# Patient Record
Sex: MALE | Race: WHITE | NOT HISPANIC OR LATINO | Employment: OTHER | URBAN - METROPOLITAN AREA
[De-identification: names, ages, dates, MRNs, and addresses within clinical notes are randomized per-mention and may not be internally consistent; named-entity substitution may affect disease eponyms.]

---

## 2019-03-05 ENCOUNTER — HOSPITAL ENCOUNTER (INPATIENT)
Facility: HOSPITAL | Age: 64
LOS: 3 days | Discharge: HOME | End: 2019-03-08
Attending: EMERGENCY MEDICINE | Admitting: FAMILY MEDICINE

## 2019-03-05 ENCOUNTER — APPOINTMENT (OUTPATIENT)
Dept: CT IMAGING | Facility: HOSPITAL | Age: 64
End: 2019-03-05

## 2019-03-05 ENCOUNTER — APPOINTMENT (OUTPATIENT)
Dept: GENERAL RADIOLOGY | Facility: HOSPITAL | Age: 64
End: 2019-03-05

## 2019-03-05 DIAGNOSIS — J44.1 COPD EXACERBATION (HCC): ICD-10-CM

## 2019-03-05 DIAGNOSIS — J18.9 PNEUMONIA OF LOWER LOBE DUE TO INFECTIOUS ORGANISM, UNSPECIFIED LATERALITY: ICD-10-CM

## 2019-03-05 DIAGNOSIS — J93.9 PNEUMOTHORAX, UNSPECIFIED TYPE: Primary | ICD-10-CM

## 2019-03-05 DIAGNOSIS — Z78.9 IMPAIRED MOBILITY AND ADLS: ICD-10-CM

## 2019-03-05 DIAGNOSIS — Z74.09 IMPAIRED FUNCTIONAL MOBILITY, BALANCE, GAIT, AND ENDURANCE: ICD-10-CM

## 2019-03-05 DIAGNOSIS — I50.22 CHRONIC SYSTOLIC HEART FAILURE (HCC): ICD-10-CM

## 2019-03-05 DIAGNOSIS — R09.02 HYPOXIA: ICD-10-CM

## 2019-03-05 DIAGNOSIS — Z74.09 IMPAIRED MOBILITY AND ADLS: ICD-10-CM

## 2019-03-05 PROBLEM — Z72.0 TOBACCO ABUSE: Status: ACTIVE | Noted: 2019-03-05

## 2019-03-05 LAB
ALBUMIN SERPL-MCNC: 4.26 G/DL (ref 3.2–4.8)
ALBUMIN/GLOB SERPL: 2 G/DL (ref 1.5–2.5)
ALP SERPL-CCNC: 67 U/L (ref 25–100)
ALT SERPL W P-5'-P-CCNC: 12 U/L (ref 7–40)
ANION GAP SERPL CALCULATED.3IONS-SCNC: 5 MMOL/L (ref 3–11)
AST SERPL-CCNC: 20 U/L (ref 0–33)
BACTERIA UR QL AUTO: ABNORMAL /HPF
BASOPHILS # BLD AUTO: 0.04 10*3/MM3 (ref 0–0.2)
BASOPHILS NFR BLD AUTO: 0.3 % (ref 0–1)
BILIRUB SERPL-MCNC: 0.4 MG/DL (ref 0.3–1.2)
BILIRUB UR QL STRIP: NEGATIVE
BNP SERPL-MCNC: 70 PG/ML (ref 0–100)
BUN BLD-MCNC: 12 MG/DL (ref 9–23)
BUN/CREAT SERPL: 14.1 (ref 7–25)
CALCIUM SPEC-SCNC: 8.9 MG/DL (ref 8.7–10.4)
CHLORIDE SERPL-SCNC: 102 MMOL/L (ref 99–109)
CLARITY UR: ABNORMAL
CO2 SERPL-SCNC: 28 MMOL/L (ref 20–31)
COLOR UR: ABNORMAL
CREAT BLD-MCNC: 0.85 MG/DL (ref 0.6–1.3)
D-LACTATE SERPL-SCNC: 1.6 MMOL/L (ref 0.5–2)
DEPRECATED RDW RBC AUTO: 47.3 FL (ref 37–54)
EOSINOPHIL # BLD AUTO: 0.02 10*3/MM3 (ref 0–0.3)
EOSINOPHIL NFR BLD AUTO: 0.1 % (ref 0–3)
ERYTHROCYTE [DISTWIDTH] IN BLOOD BY AUTOMATED COUNT: 14.9 % (ref 11.3–14.5)
FLUAV AG NPH QL: NEGATIVE
FLUBV AG NPH QL IA: NEGATIVE
GFR SERPL CREATININE-BSD FRML MDRD: 91 ML/MIN/1.73
GLOBULIN UR ELPH-MCNC: 2.1 GM/DL
GLUCOSE BLD-MCNC: 99 MG/DL (ref 70–100)
GLUCOSE UR STRIP-MCNC: NEGATIVE MG/DL
HCT VFR BLD AUTO: 43.7 % (ref 38.9–50.9)
HGB BLD-MCNC: 13.9 G/DL (ref 13.1–17.5)
HGB UR QL STRIP.AUTO: ABNORMAL
HOLD SPECIMEN: NORMAL
HYALINE CASTS UR QL AUTO: ABNORMAL /LPF
IMM GRANULOCYTES # BLD AUTO: 0.03 10*3/MM3 (ref 0–0.05)
IMM GRANULOCYTES NFR BLD AUTO: 0.2 % (ref 0–0.6)
KETONES UR QL STRIP: NEGATIVE
LEUKOCYTE ESTERASE UR QL STRIP.AUTO: ABNORMAL
LYMPHOCYTES # BLD AUTO: 1.7 10*3/MM3 (ref 0.6–4.8)
LYMPHOCYTES NFR BLD AUTO: 11.1 % (ref 24–44)
MCH RBC QN AUTO: 27.8 PG (ref 27–31)
MCHC RBC AUTO-ENTMCNC: 31.8 G/DL (ref 32–36)
MCV RBC AUTO: 87.4 FL (ref 80–99)
MONOCYTES # BLD AUTO: 1.71 10*3/MM3 (ref 0–1)
MONOCYTES NFR BLD AUTO: 11.2 % (ref 0–12)
NEUTROPHILS # BLD AUTO: 11.83 10*3/MM3 (ref 1.5–8.3)
NEUTROPHILS NFR BLD AUTO: 77.3 % (ref 41–71)
NITRITE UR QL STRIP: NEGATIVE
PH UR STRIP.AUTO: 8.5 [PH] (ref 5–8)
PLATELET # BLD AUTO: 398 10*3/MM3 (ref 150–450)
PMV BLD AUTO: 10.3 FL (ref 6–12)
POTASSIUM BLD-SCNC: 4.8 MMOL/L (ref 3.5–5.5)
PROCALCITONIN SERPL-MCNC: <0.05 NG/ML
PROT SERPL-MCNC: 6.4 G/DL (ref 5.7–8.2)
PROT UR QL STRIP: NEGATIVE
RBC # BLD AUTO: 5 10*6/MM3 (ref 4.2–5.76)
RBC # UR: ABNORMAL /HPF
REF LAB TEST METHOD: ABNORMAL
SODIUM BLD-SCNC: 135 MMOL/L (ref 132–146)
SP GR UR STRIP: 1.05 (ref 1–1.03)
SQUAMOUS #/AREA URNS HPF: ABNORMAL /HPF
TROPONIN I SERPL-MCNC: 0.04 NG/ML (ref 0–0.07)
UROBILINOGEN UR QL STRIP: ABNORMAL
WBC NRBC COR # BLD: 15.3 10*3/MM3 (ref 3.5–10.8)
WBC UR QL AUTO: ABNORMAL /HPF
WHOLE BLOOD HOLD SPECIMEN: NORMAL

## 2019-03-05 PROCEDURE — 81001 URINALYSIS AUTO W/SCOPE: CPT | Performed by: EMERGENCY MEDICINE

## 2019-03-05 PROCEDURE — 63710000001 PREDNISONE PER 1 MG: Performed by: EMERGENCY MEDICINE

## 2019-03-05 PROCEDURE — 83605 ASSAY OF LACTIC ACID: CPT | Performed by: EMERGENCY MEDICINE

## 2019-03-05 PROCEDURE — 87077 CULTURE AEROBIC IDENTIFY: CPT | Performed by: EMERGENCY MEDICINE

## 2019-03-05 PROCEDURE — 80053 COMPREHEN METABOLIC PANEL: CPT | Performed by: EMERGENCY MEDICINE

## 2019-03-05 PROCEDURE — 87186 SC STD MICRODIL/AGAR DIL: CPT | Performed by: EMERGENCY MEDICINE

## 2019-03-05 PROCEDURE — 87147 CULTURE TYPE IMMUNOLOGIC: CPT | Performed by: EMERGENCY MEDICINE

## 2019-03-05 PROCEDURE — 85025 COMPLETE CBC W/AUTO DIFF WBC: CPT | Performed by: EMERGENCY MEDICINE

## 2019-03-05 PROCEDURE — 71045 X-RAY EXAM CHEST 1 VIEW: CPT

## 2019-03-05 PROCEDURE — 87040 BLOOD CULTURE FOR BACTERIA: CPT | Performed by: EMERGENCY MEDICINE

## 2019-03-05 PROCEDURE — 87150 DNA/RNA AMPLIFIED PROBE: CPT | Performed by: EMERGENCY MEDICINE

## 2019-03-05 PROCEDURE — 94640 AIRWAY INHALATION TREATMENT: CPT

## 2019-03-05 PROCEDURE — 94799 UNLISTED PULMONARY SVC/PX: CPT

## 2019-03-05 PROCEDURE — 93005 ELECTROCARDIOGRAM TRACING: CPT | Performed by: EMERGENCY MEDICINE

## 2019-03-05 PROCEDURE — 99285 EMERGENCY DEPT VISIT HI MDM: CPT

## 2019-03-05 PROCEDURE — 83880 ASSAY OF NATRIURETIC PEPTIDE: CPT | Performed by: EMERGENCY MEDICINE

## 2019-03-05 PROCEDURE — 87804 INFLUENZA ASSAY W/OPTIC: CPT | Performed by: EMERGENCY MEDICINE

## 2019-03-05 PROCEDURE — 99223 1ST HOSP IP/OBS HIGH 75: CPT | Performed by: INTERNAL MEDICINE

## 2019-03-05 PROCEDURE — 0 IOPAMIDOL PER 1 ML: Performed by: EMERGENCY MEDICINE

## 2019-03-05 PROCEDURE — 84145 PROCALCITONIN (PCT): CPT | Performed by: EMERGENCY MEDICINE

## 2019-03-05 PROCEDURE — 71275 CT ANGIOGRAPHY CHEST: CPT

## 2019-03-05 PROCEDURE — 99222 1ST HOSP IP/OBS MODERATE 55: CPT | Performed by: THORACIC SURGERY (CARDIOTHORACIC VASCULAR SURGERY)

## 2019-03-05 PROCEDURE — 84484 ASSAY OF TROPONIN QUANT: CPT

## 2019-03-05 RX ORDER — SODIUM CHLORIDE 0.9 % (FLUSH) 0.9 %
10 SYRINGE (ML) INJECTION AS NEEDED
Status: DISCONTINUED | OUTPATIENT
Start: 2019-03-05 | End: 2019-03-08 | Stop reason: HOSPADM

## 2019-03-05 RX ORDER — CHOLECALCIFEROL (VITAMIN D3) 125 MCG
5 CAPSULE ORAL NIGHTLY PRN
Status: DISCONTINUED | OUTPATIENT
Start: 2019-03-05 | End: 2019-03-08 | Stop reason: HOSPADM

## 2019-03-05 RX ORDER — ONDANSETRON 4 MG/1
4 TABLET, FILM COATED ORAL EVERY 6 HOURS PRN
Status: DISCONTINUED | OUTPATIENT
Start: 2019-03-05 | End: 2019-03-08 | Stop reason: HOSPADM

## 2019-03-05 RX ORDER — DOXYCYCLINE 100 MG/1
100 CAPSULE ORAL EVERY 12 HOURS SCHEDULED
Status: DISCONTINUED | OUTPATIENT
Start: 2019-03-05 | End: 2019-03-08 | Stop reason: HOSPADM

## 2019-03-05 RX ORDER — PREDNISONE 20 MG/1
60 TABLET ORAL ONCE
Status: COMPLETED | OUTPATIENT
Start: 2019-03-05 | End: 2019-03-05

## 2019-03-05 RX ORDER — PREDNISONE 20 MG/1
40 TABLET ORAL
Status: DISCONTINUED | OUTPATIENT
Start: 2019-03-06 | End: 2019-03-07

## 2019-03-05 RX ORDER — SODIUM CHLORIDE 0.9 % (FLUSH) 0.9 %
3-10 SYRINGE (ML) INJECTION AS NEEDED
Status: DISCONTINUED | OUTPATIENT
Start: 2019-03-05 | End: 2019-03-08 | Stop reason: HOSPADM

## 2019-03-05 RX ORDER — ACETAMINOPHEN 500 MG
1000 TABLET ORAL EVERY MORNING
COMMUNITY
End: 2019-07-11

## 2019-03-05 RX ORDER — IPRATROPIUM BROMIDE AND ALBUTEROL SULFATE 2.5; .5 MG/3ML; MG/3ML
3 SOLUTION RESPIRATORY (INHALATION) ONCE
Status: COMPLETED | OUTPATIENT
Start: 2019-03-05 | End: 2019-03-05

## 2019-03-05 RX ORDER — HYDROMORPHONE HYDROCHLORIDE 1 MG/ML
0.5 INJECTION, SOLUTION INTRAMUSCULAR; INTRAVENOUS; SUBCUTANEOUS
Status: DISCONTINUED | OUTPATIENT
Start: 2019-03-05 | End: 2019-03-08 | Stop reason: HOSPADM

## 2019-03-05 RX ORDER — MAGNESIUM SULFATE HEPTAHYDRATE 40 MG/ML
2 INJECTION, SOLUTION INTRAVENOUS AS NEEDED
Status: DISCONTINUED | OUTPATIENT
Start: 2019-03-05 | End: 2019-03-08 | Stop reason: HOSPADM

## 2019-03-05 RX ORDER — SODIUM CHLORIDE 0.9 % (FLUSH) 0.9 %
3 SYRINGE (ML) INJECTION EVERY 12 HOURS SCHEDULED
Status: DISCONTINUED | OUTPATIENT
Start: 2019-03-05 | End: 2019-03-08 | Stop reason: HOSPADM

## 2019-03-05 RX ORDER — POTASSIUM CHLORIDE 750 MG/1
40 CAPSULE, EXTENDED RELEASE ORAL AS NEEDED
Status: DISCONTINUED | OUTPATIENT
Start: 2019-03-05 | End: 2019-03-08 | Stop reason: HOSPADM

## 2019-03-05 RX ORDER — POTASSIUM CHLORIDE 7.45 MG/ML
10 INJECTION INTRAVENOUS
Status: DISCONTINUED | OUTPATIENT
Start: 2019-03-05 | End: 2019-03-08 | Stop reason: HOSPADM

## 2019-03-05 RX ORDER — POTASSIUM CHLORIDE 1.5 G/1.77G
40 POWDER, FOR SOLUTION ORAL AS NEEDED
Status: DISCONTINUED | OUTPATIENT
Start: 2019-03-05 | End: 2019-03-08 | Stop reason: HOSPADM

## 2019-03-05 RX ORDER — IPRATROPIUM BROMIDE AND ALBUTEROL SULFATE 2.5; .5 MG/3ML; MG/3ML
3 SOLUTION RESPIRATORY (INHALATION) EVERY 4 HOURS PRN
Status: DISCONTINUED | OUTPATIENT
Start: 2019-03-05 | End: 2019-03-07

## 2019-03-05 RX ORDER — ACETAMINOPHEN 325 MG/1
650 TABLET ORAL EVERY 4 HOURS PRN
Status: DISCONTINUED | OUTPATIENT
Start: 2019-03-05 | End: 2019-03-08 | Stop reason: HOSPADM

## 2019-03-05 RX ORDER — HYDROCODONE BITARTRATE AND ACETAMINOPHEN 5; 325 MG/1; MG/1
1 TABLET ORAL EVERY 4 HOURS PRN
Status: DISCONTINUED | OUTPATIENT
Start: 2019-03-05 | End: 2019-03-07

## 2019-03-05 RX ORDER — NICOTINE 21 MG/24HR
1 PATCH, TRANSDERMAL 24 HOURS TRANSDERMAL
Status: DISCONTINUED | OUTPATIENT
Start: 2019-03-05 | End: 2019-03-08 | Stop reason: HOSPADM

## 2019-03-05 RX ORDER — MAGNESIUM SULFATE HEPTAHYDRATE 40 MG/ML
4 INJECTION, SOLUTION INTRAVENOUS AS NEEDED
Status: DISCONTINUED | OUTPATIENT
Start: 2019-03-05 | End: 2019-03-08 | Stop reason: HOSPADM

## 2019-03-05 RX ORDER — ONDANSETRON 2 MG/ML
4 INJECTION INTRAMUSCULAR; INTRAVENOUS EVERY 6 HOURS PRN
Status: DISCONTINUED | OUTPATIENT
Start: 2019-03-05 | End: 2019-03-08 | Stop reason: HOSPADM

## 2019-03-05 RX ORDER — NALOXONE HCL 0.4 MG/ML
0.4 VIAL (ML) INJECTION
Status: DISCONTINUED | OUTPATIENT
Start: 2019-03-05 | End: 2019-03-08 | Stop reason: HOSPADM

## 2019-03-05 RX ORDER — SENNA AND DOCUSATE SODIUM 50; 8.6 MG/1; MG/1
2 TABLET, FILM COATED ORAL 2 TIMES DAILY PRN
Status: DISCONTINUED | OUTPATIENT
Start: 2019-03-05 | End: 2019-03-08 | Stop reason: HOSPADM

## 2019-03-05 RX ADMIN — IOPAMIDOL 80 ML: 755 INJECTION, SOLUTION INTRAVENOUS at 13:00

## 2019-03-05 RX ADMIN — DOXYCYCLINE 100 MG: 100 CAPSULE ORAL at 14:58

## 2019-03-05 RX ADMIN — SODIUM CHLORIDE, PRESERVATIVE FREE 3 ML: 5 INJECTION INTRAVENOUS at 20:12

## 2019-03-05 RX ADMIN — NICOTINE 1 PATCH: 21 PATCH, EXTENDED RELEASE TRANSDERMAL at 20:11

## 2019-03-05 RX ADMIN — HYDROCODONE BITARTRATE AND ACETAMINOPHEN 1 TABLET: 5; 325 TABLET ORAL at 20:12

## 2019-03-05 RX ADMIN — IPRATROPIUM BROMIDE AND ALBUTEROL SULFATE 3 ML: 2.5; .5 SOLUTION RESPIRATORY (INHALATION) at 12:05

## 2019-03-05 RX ADMIN — PREDNISONE 60 MG: 20 TABLET ORAL at 11:37

## 2019-03-05 RX ADMIN — DOXYCYCLINE 100 MG: 100 CAPSULE ORAL at 20:11

## 2019-03-05 NOTE — H&P
[facility] Medicine Services  HISTORY AND PHYSICAL    Patient Name: Luther Martines  : 1955  MRN: [id number]  Primary Care Physician: Amrit Sosa MD  Date of admission: 3/5/2019      Subjective   Subjective     Chief Complaint: SOA    HPI:  Luther Martines is a 63 y.o. male coming into ED with 4-5 days of worsening SOA. This has gradually worsened over this time period. Has not improved with his home albuterol treatments and IV decadron which he received at outside [facility]. He has had ongoing productive cough since this time. No f/c, chest pain.    Review of Systems   Gen- No fevers, chills  CV- No chest pain, palpitations  GI- No N/V/D, abd pain    Otherwise complete ROS reviewed and is negative except as mentioned in the HPI.    Personal History     Past Medical History:   Diagnosis Date   • Seizures (CMS/HCC)        Past Surgical History:   Procedure Laterality Date   • BRAIN SURGERY         Family History: family history is not on file. Otherwise pertinent FHx was reviewed and unremarkable.     Social History:  reports that he has been smoking.  He has been smoking about 2.00 packs per day. He does not have any smokeless tobacco history on file. He reports that he does not drink alcohol or use drugs.  Social History     Social History Narrative   • Not on file       Medications:    Available home medication information reviewed.    (Not in a hospital admission)    Allergies   Allergen Reactions   • Morphine Anxiety       Objective   Objective     Vital Signs:   Temp:  [98 °F (36.7 °C)-99.7 °F (37.6 °C)] 99.7 °F (37.6 °C)  Heart Rate:  [] 90  Resp:  [20-26] 20  BP: (125-184)/() 125/82        Physical Exam   Constitutional: No acute distress, awake, alert  Eyes: PERRLA, sclerae anicteric, no conjunctival injection  HENT: NCAT, mucous membranes moist  Neck: Supple, no thyromegaly, no lymphadenopathy, trachea midline  Respiratory: Normal WOB, wheezes  bilaterally  Cardiovascular: RRR, no murmurs, rubs, or gallops, palpable pedal pulses bilaterally  Gastrointestinal: Positive bowel sounds, soft, nontender, nondistended  Musculoskeletal: LLE significantly smaller than RLE which is chronic  Psychiatric: Appropriate affect, cooperative  Neurologic: Oriented x 3, strength symmetric in all extremities, Cranial Nerves grossly intact to confrontation, speech clear  Skin: No rashes    Results Reviewed:  I have personally reviewed current lab, radiology, and data and agree.        Results from last 7 days   Lab Units 03/05/19  1159 03/05/19  1129   SODIUM mmol/L 135  --    POTASSIUM mmol/L 4.8  --    CHLORIDE mmol/L 102  --    CO2 mmol/L 28.0  --    BUN mg/dL 12  --    CREATININE mg/dL 0.85  --    GLUCOSE mg/dL 99  --    CALCIUM mg/dL 8.9  --    ALT (SGPT) U/L 12  --    AST (SGOT) U/L 20  --    TROPONIN I ng/mL  --  0.04     Estimated Creatinine Clearance: 66.8 mL/min (by C-G formula based on SCr of 0.85 mg/dL).  Brief Urine Lab Results     None        BNP   Date Value Ref Range Status   03/05/2019 70.0 0.0 - 100.0 pg/mL Final     Comment:     Results may be falsely decreased if patient taking Biotin.     CTA chest personally reviewed without PTX by my review. Will await final interpretation.   Imaging Results (last 24 hours)     Procedure Component Value Units Date/Time    XR Chest 1 View [[id number]] Collected:  03/05/19 1143     Updated:  03/05/19 1336    Narrative:       EXAMINATION: XR CHEST 1 VW-      INDICATION: Shortness of air triage protocol.      COMPARISON: None.     FINDINGS: There is a left apical and lateral pneumothorax. There also  appears to be thickening of the more lateral parietal pleura. There are  extensive chronic pulmonary changes. The cardiac silhouette is normal.  There is no prior exam for comparison.           Impression:       There appears to be a left apical and lateral left  pneumothorax. There is also marked pleural thickening involving the  more  lateral parietal pleura. There is no prior exam for comparison. A CT  scan would be helpful to further determine the size of the pneumothorax.     D:  03/05/2019  E:  03/05/2019     This report was finalized on 3/5/2019 1:34 PM by Dr. Juwan Stock MD.       CT Angiogram Chest With Contrast [[id number]] Updated:  03/05/19 1310             Assessment/Plan   Assessment / Plan     Active Hospital Problems    Diagnosis Date Noted   • COPD exacerbation (CMS/HCC) [J44.1] 03/05/2019   • Pneumonia due to infectious organism [J18.9] 03/05/2019   • Hypoxia [R09.02] 03/05/2019   • Tobacco abuse [Z72.0] 03/05/2019     64 y/o male with COPD presenting from home with progressive SOA x 4-5 days concerning for COPD exacerbation.    A/P:    Bacterial CAP  COPD exacerbation  Hypoxia with abnl CXR  --Initial concern in ED was for PTX based on CXR however CTA did not show this. Will treat for PNA/COPD exacerbation with steroids, doxy, nebs, O2 as needed.    Tobacco abuse  --Needs to stop smoking. D/W patient. Provide NRT.    DVT prophylaxis: Mechanical.    CODE STATUS:    Code Status and Medical Interventions:   Ordered at: 03/05/19 1312     Code Status:    CPR     Medical Interventions (Level of Support Prior to Arrest):    Full       Admission Status:  I believe this patient meets OBSERVATION status, however if further evaluation or treatment plans warrant, status may change.  Based upon current information, I predict patient's care encounter to be less than or equal to 2 midnights.    Electronically signed by Tamanna Smith II, DO, 03/05/19, 2:04 PM.
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
## 2019-03-06 ENCOUNTER — APPOINTMENT (OUTPATIENT)
Dept: CARDIOLOGY | Facility: HOSPITAL | Age: 64
End: 2019-03-06

## 2019-03-06 ENCOUNTER — APPOINTMENT (OUTPATIENT)
Dept: GENERAL RADIOLOGY | Facility: HOSPITAL | Age: 64
End: 2019-03-06

## 2019-03-06 LAB
BACTERIA BLD CULT: ABNORMAL
BH CV ECHO MEAS - AO ROOT AREA: 11.5 CM^2
BH CV ECHO MEAS - AO ROOT DIAM: 3.8 CM
BH CV ECHO MEAS - EDV(CUBED): 39.6 ML
BH CV ECHO MEAS - EDV(TEICH): 47.7 ML
BH CV ECHO MEAS - EF(CUBED): 51.5 %
BH CV ECHO MEAS - EF(TEICH): 44.6 %
BH CV ECHO MEAS - ESV(CUBED): 19.2 ML
BH CV ECHO MEAS - ESV(TEICH): 26.5 ML
BH CV ECHO MEAS - FS: 21.5 %
BH CV ECHO MEAS - IVS/LVPW: 1.1
BH CV ECHO MEAS - IVSD: 0.81 CM
BH CV ECHO MEAS - LA DIMENSION: 2.7 CM
BH CV ECHO MEAS - LA/AO: 0.71
BH CV ECHO MEAS - LV MASS(C)D: 70.6 GRAMS
BH CV ECHO MEAS - LVIDD: 3.4 CM
BH CV ECHO MEAS - LVIDS: 2.7 CM
BH CV ECHO MEAS - LVPWD: 0.76 CM
BH CV ECHO MEAS - PA ACC SLOPE: 522.2 CM/SEC^2
BH CV ECHO MEAS - PA ACC TIME: 0.14 SEC
BH CV ECHO MEAS - PA PR(ACCEL): 15.6 MMHG
BH CV ECHO MEAS - RAP SYSTOLE: 3 MMHG
BH CV ECHO MEAS - RVSP: 19 MMHG
BH CV ECHO MEAS - SV(CUBED): 20.4 ML
BH CV ECHO MEAS - SV(TEICH): 21.3 ML
BH CV ECHO MEAS - TR MAX PG: 16 MMHG
BH CV ECHO MEAS - TR MAX VEL: 200.9 CM/SEC
BH CV VAS BP RIGHT ARM: NORMAL MMHG
BH CV XLRA - RV BASE: 3 CM
BH CV XLRA - RV LENGTH: 4.6 CM
BH CV XLRA - RV MID: 2.5 CM
MAGNESIUM SERPL-MCNC: 2.2 MG/DL (ref 1.3–2.7)
MAXIMAL PREDICTED HEART RATE: 157 BPM
STRESS TARGET HR: 133 BPM
TSH SERPL DL<=0.05 MIU/L-ACNC: 0.4 MIU/ML (ref 0.35–5.35)

## 2019-03-06 PROCEDURE — 25010000002 DIGOXIN PER 500 MCG: Performed by: INTERNAL MEDICINE

## 2019-03-06 PROCEDURE — 88112 CYTOPATH CELL ENHANCE TECH: CPT | Performed by: INTERNAL MEDICINE

## 2019-03-06 PROCEDURE — 93306 TTE W/DOPPLER COMPLETE: CPT | Performed by: INTERNAL MEDICINE

## 2019-03-06 PROCEDURE — 93005 ELECTROCARDIOGRAM TRACING: CPT | Performed by: INTERNAL MEDICINE

## 2019-03-06 PROCEDURE — 84443 ASSAY THYROID STIM HORMONE: CPT | Performed by: INTERNAL MEDICINE

## 2019-03-06 PROCEDURE — 83735 ASSAY OF MAGNESIUM: CPT | Performed by: INTERNAL MEDICINE

## 2019-03-06 PROCEDURE — 74018 RADEX ABDOMEN 1 VIEW: CPT

## 2019-03-06 PROCEDURE — 93306 TTE W/DOPPLER COMPLETE: CPT

## 2019-03-06 PROCEDURE — 99233 SBSQ HOSP IP/OBS HIGH 50: CPT | Performed by: INTERNAL MEDICINE

## 2019-03-06 PROCEDURE — 93010 ELECTROCARDIOGRAM REPORT: CPT | Performed by: INTERNAL MEDICINE

## 2019-03-06 PROCEDURE — 63710000001 PREDNISONE PER 1 MG: Performed by: INTERNAL MEDICINE

## 2019-03-06 RX ORDER — DIGOXIN 0.25 MG/ML
250 INJECTION INTRAMUSCULAR; INTRAVENOUS EVERY 6 HOURS
Status: DISPENSED | OUTPATIENT
Start: 2019-03-06 | End: 2019-03-06

## 2019-03-06 RX ORDER — DILTIAZEM HYDROCHLORIDE 180 MG/1
180 CAPSULE, COATED, EXTENDED RELEASE ORAL
Status: DISCONTINUED | OUTPATIENT
Start: 2019-03-06 | End: 2019-03-08 | Stop reason: HOSPADM

## 2019-03-06 RX ORDER — GUAIFENESIN/DEXTROMETHORPHAN 100-10MG/5
5 SYRUP ORAL EVERY 4 HOURS PRN
Status: DISCONTINUED | OUTPATIENT
Start: 2019-03-06 | End: 2019-03-08 | Stop reason: HOSPADM

## 2019-03-06 RX ORDER — METOPROLOL TARTRATE 5 MG/5ML
5 INJECTION INTRAVENOUS ONCE
Status: COMPLETED | OUTPATIENT
Start: 2019-03-06 | End: 2019-03-06

## 2019-03-06 RX ADMIN — NICOTINE 1 PATCH: 21 PATCH, EXTENDED RELEASE TRANSDERMAL at 08:50

## 2019-03-06 RX ADMIN — SODIUM CHLORIDE, PRESERVATIVE FREE 3 ML: 5 INJECTION INTRAVENOUS at 19:58

## 2019-03-06 RX ADMIN — MELATONIN TAB 5 MG 5 MG: 5 TAB at 19:58

## 2019-03-06 RX ADMIN — DOXYCYCLINE 100 MG: 100 CAPSULE ORAL at 19:58

## 2019-03-06 RX ADMIN — DIGOXIN 250 MCG: 0.25 INJECTION INTRAMUSCULAR; INTRAVENOUS at 09:52

## 2019-03-06 RX ADMIN — METOPROLOL TARTRATE 5 MG: 1 INJECTION, SOLUTION INTRAVENOUS at 10:40

## 2019-03-06 RX ADMIN — DOXYCYCLINE 100 MG: 100 CAPSULE ORAL at 08:49

## 2019-03-06 RX ADMIN — HYDROCODONE BITARTRATE AND ACETAMINOPHEN 1 TABLET: 5; 325 TABLET ORAL at 19:58

## 2019-03-06 RX ADMIN — PREDNISONE 40 MG: 20 TABLET ORAL at 08:49

## 2019-03-06 RX ADMIN — GUAIFENESIN AND DEXTROMETHORPHAN 5 ML: 100; 10 SYRUP ORAL at 16:03

## 2019-03-06 RX ADMIN — DILTIAZEM HYDROCHLORIDE 180 MG: 180 CAPSULE, COATED, EXTENDED RELEASE ORAL at 14:17

## 2019-03-06 RX ADMIN — SODIUM CHLORIDE, PRESERVATIVE FREE 3 ML: 5 INJECTION INTRAVENOUS at 08:52

## 2019-03-06 NOTE — PROGRESS NOTES
Discharge Planning Assessment  [facility]     Patient Name: Luther Martines  MRN: [id number]  Today's Date: 3/6/2019    Admit Date: 3/5/2019    Discharge Needs Assessment     Row Name 03/06/19 1534       Living Environment    Lives With  spouse    Name(s) of Who Lives With Patient  Champ Martines (wife) [phone]    Current Living Arrangements  home/apartment/condo    Primary Care Provided by  self    Provides Primary Care For  no one    Family Caregiver if Needed  spouse    Family Caregiver Names  Champ Martines (Spouse) [phone]    Quality of Family Relationships  helpful;involved;supportive    Able to Return to Prior Arrangements  yes       Resource/Environmental Concerns    Resource/Environmental Concerns  none       Transition Planning    Patient/Family Anticipates Transition to  home with family    Patient/Family Anticipated Services at Transition      Transportation Anticipated  family or friend will provide       Discharge Needs Assessment    Readmission Within the Last 30 Days  no previous admission in last 30 days    Concerns to be Addressed  discharge planning    Equipment Currently Used at Home  cane, straight;walker, rolling;grab bar;commode;walker, standard;nebulizer    Anticipated Changes Related to Illness  inability to care for self    Equipment Needed After Discharge  -- Patient requesting a motorized scooter.  Informed that this equipment is not covered with insurance    Outpatient/Agency/Support Group Needs  homecare agency    Discharge Facility/Level of Care Needs  home with home health    Offered/Gave Vendor List  no        Discharge Plan     Row Name 03/06/19 1537       Plan    Plan  Home with home health    Patient/Family in Agreement with Plan  yes    Plan Comments  Spoke with patient and wife at bedside.  Patient lives in [place] with wife.  Patient needs assist with ADL's.  He has declining mobility, and has macular degeneration.  Patient has poor eyesight.  Wife states  that patient can ambulate, but generally has to hold on to the walls or something in the home to get around.  She states that he can't use his cane or his walker for this reason and they need a motorized scooter for their home.  Informed patient and wife that motorized scooters are private pay DME, and offered to provide list of DME companies.  Wife states that she can look into it later.  Wife states that patient has rolling walker, cane, bedside commode, shower chair, wheelchair, grab bars and raised toilet seats in the bathroom and their house has been made handicap accesible.  States that patient is not current with home health or any other services.  Patient states that he can afford medications.  PCP is Slim Sosa M.D. per patient insurance provider is Medicare A and B with Supp. [employer].  Patient anticipates discharge home with possible home health.  Patient does not have home health agency of preference at this time.  PT and OT to evaluate patient to determine needs for discharge.  Family will transport patient at discharge.  Case management will continue to follow for discharge planning.      Final Discharge Disposition Code  06 - home with home health care        Destination      No service coordination in this encounter.      Durable Medical Equipment      No service coordination in this encounter.      Dialysis/Infusion      No service coordination in this encounter.      Home Medical Care      No service coordination in this encounter.      Community Resources      No service coordination in this encounter.          Demographic Summary     Row Name 03/06/19 7367       General Information    Admission Type  inpatient    Arrived From  home    Referral Source  admission list    Reason for Consult  discharge planning    Preferred Language  English     Used During This Interaction  no    General Information Comments  PCP:  Slim Sosa M.D. confirmed with patient        Functional Status     Row Name  03/06/19 1533       Functional Status    Usual Activity Tolerance  moderate    Current Activity Tolerance  moderate       Functional Status, IADL    Medications  assistive person    Meal Preparation  assistive person    Housekeeping  assistive person    Laundry  assistive person    Shopping  assistive person        Psychosocial    No documentation.       Abuse/Neglect    No documentation.       Legal    No documentation.       Substance Abuse    No documentation.       Patient Forms    No documentation.           Oneida Chacon RN

## 2019-03-06 NOTE — PLAN OF CARE
Problem: Patient Care Overview  Goal: Plan of Care Review  Outcome: Ongoing (interventions implemented as appropriate)   03/06/19 1625   Coping/Psychosocial   Plan of Care Reviewed With patient   Plan of Care Review   Progress no change   OTHER   Outcome Summary pt went into afib RVR this AM, IV and oral meds given. patient converted to NSR around 1330. urology consulted for hematuria. pt was requiring 2L NC. titrated to RA currently. will continue to monitor        Problem: Fall Risk (Adult)  Goal: Identify Related Risk Factors and Signs and Symptoms  Outcome: Outcome(s) achieved Date Met: 03/06/19    Goal: Absence of Fall  Outcome: Ongoing (interventions implemented as appropriate)      Problem: Chronic Obstructive Pulmonary Disease (Adult)  Goal: Signs and Symptoms of Listed Potential Problems Will be Absent, Minimized or Managed (Chronic Obstructive Pulmonary Disease)  Outcome: Ongoing (interventions implemented as appropriate)      Problem: Skin Injury Risk (Adult)  Goal: Identify Related Risk Factors and Signs and Symptoms  Outcome: Outcome(s) achieved Date Met: 03/06/19    Goal: Skin Health and Integrity  Outcome: Outcome(s) achieved Date Met: 03/06/19      Problem: Arrhythmia/Dysrhythmia (Symptomatic) (Adult)  Goal: Signs and Symptoms of Listed Potential Problems Will be Absent, Minimized or Managed (Arrhythmia/Dysrhythmia)  Outcome: Ongoing (interventions implemented as appropriate)

## 2019-03-06 NOTE — PLAN OF CARE
Problem: Patient Care Overview  Goal: Plan of Care Review  Outcome: Ongoing (interventions implemented as appropriate)   03/06/19 0223   Coping/Psychosocial   Plan of Care Reviewed With patient;family   Plan of Care Review   Progress improving   OTHER   Outcome Summary Pt able to ambulate with stand-by assist to bathroom. Use of cane at baseline. UA shows blood r/t possible renal stone. PO abx. nicotine patch ordered to help with anxiety r/t withdrawal. 1L NC with sats in 90s. minimal compliants of SOA during shift, but present with ambulation. continue to monitor.       Problem: Fall Risk (Adult)  Goal: Identify Related Risk Factors and Signs and Symptoms  Outcome: Ongoing (interventions implemented as appropriate)    Goal: Absence of Fall  Outcome: Ongoing (interventions implemented as appropriate)      Problem: Chronic Obstructive Pulmonary Disease (Adult)  Goal: Signs and Symptoms of Listed Potential Problems Will be Absent, Minimized or Managed (Chronic Obstructive Pulmonary Disease)  Outcome: Ongoing (interventions implemented as appropriate)

## 2019-03-06 NOTE — PROGRESS NOTES
[facility] Medicine Services  PROGRESS NOTE    Patient Name: Luther Martines  : 1955  MRN: [id number]    Date of Admission: 3/5/2019  Length of Stay: 1  Primary Care Physician: Amrit Sosa MD    Subjective   Subjective     CC:  dyspnea    HPI:  Breathing is not terrible but worse than baseline.   Palpitations today without dizziness - recurrent issue but never diagnosed as afib.  He has no cardiologist    Review of Systems   No fever.  no Chills  Ongoing cough  No chest pain or pressure; no cardiologist.  Longstadnign hist of palpits with resp distress or emotional upset.  Gross hematuria, hist of renal stones, no urologist, no hist of cystoscopy.  No flank or groin pain.  Otherwise ROS is negative except as mentioned in the HPI.    Objective   Objective     Vital Signs:   Temp:  [97 °F (36.1 °C)-99.7 °F (37.6 °C)] 97.3 °F (36.3 °C)  Heart Rate:  [] 63  Resp:  [18-26] 20  BP: ()/() 82/57        Physical Exam:  Gen:  slighty disheveled man in NAD, walks to BR off oxygen and without cane/walker.  L HP noted.  Neuro: alert and oriented, clear speech, follows commands, L HP arm worse than leg  HEENT:  NC/AT PERRL, OP benign  Neck:  Supple, no LAD  Heart irreg irreg rate 150s no murmur, rub, or gallop  Lungs scattered wheeze, mild tachypnea, no access musle use.  Abd:  Soft, nontender, no rebound or guarding, pos BS  Extrem:  No c/c/e    Results Reviewed:  I have personally reviewed current lab, radiology, and data and agree.    Results from last 7 days   Lab Units 19  1127   WBC 10*3/mm3 15.30*   HEMOGLOBIN g/dL 13.9   HEMATOCRIT % 43.7   PLATELETS 10*3/mm3 398     Results from last 7 days   Lab Units 19  1159 19  1129   SODIUM mmol/L 135  --    POTASSIUM mmol/L 4.8  --    CHLORIDE mmol/L 102  --    CO2 mmol/L 28.0  --    BUN mg/dL 12  --    CREATININE mg/dL 0.85  --    GLUCOSE mg/dL 99  --    CALCIUM mg/dL 8.9  --    ALT (SGPT) U/L 12  --    AST  (SGOT) U/L 20  --    TROPONIN I ng/mL  --  0.04     Estimated Creatinine Clearance: 66.8 mL/min (by C-G formula based on SCr of 0.85 mg/dL).    BNP   Date Value Ref Range Status   03/05/2019 70.0 0.0 - 100.0 pg/mL Final     Comment:     Results may be falsely decreased if patient taking Biotin.       Microbiology Results Abnormal     Procedure Component Value - Date/Time    Blood Culture - Blood, Arm, Right [[id number]] Collected:  03/05/19 1115    Lab Status:  Preliminary result Specimen:  Blood from Arm, Right Updated:  03/06/19 0030     Blood Culture No growth at less than 24 hours    Blood Culture - Blood, Arm, Right [[id number]] Collected:  03/05/19 1130    Lab Status:  Preliminary result Specimen:  Blood from Arm, Right Updated:  03/06/19 0030     Blood Culture No growth at less than 24 hours    Influenza Antigen, Rapid - Swab, Nasopharynx [[id number]]  (Normal) Collected:  03/05/19 1126    Lab Status:  Final result Specimen:  Swab from Nasopharynx Updated:  03/05/19 1203     Influenza A Ag, EIA Negative     Influenza B Ag, EIA Negative          Imaging Results (last 24 hours)     Procedure Component Value Units Date/Time    CT Angiogram Chest With Contrast [[id number]] Collected:  03/05/19 1413     Updated:  03/05/19 1633    Narrative:       EXAMINATION: CT ANGIOGRAM CHEST WITH CONTRAST-03/05/2019:      INDICATION: Dyspnea/pneumothorax.         TECHNIQUE: CT angiogram of the chest was performed following bolus  infusion of contrast. Images are displayed in the axial and coronal  projections (2-D).     The radiation dose reduction device was turned on for each scan per the  ALARA (As Low as Reasonably Achievable) protocol.     COMPARISON: Chest x-ray of the same day.     FINDINGS: There is no axillary lymphadenopathy. There is no significant  mediastinal or hilar adenopathy. There is no pulmonary embolus. There is  no pericardial effusion. Limited images of the upper abdomen are normal.  Images displayed at lung  window settings fail to confirm the presence of  a pneumothorax. There are dense pleural calcifications which in the AP  projection could simulate a pneumothorax. There is a small focal  somewhat nodular area of airspace disease in the left upper lobe  laterally. There is no consolidation. There are chronic obstructive  findings.       Impression:       1. There is no evidence of pulmonary embolus.  2. There is no pneumothorax. There are dense pleural calcifications  anteriorly which can simulate a pneumothorax on a chest x-ray.  3. There is a small somewhat nodular airspace process in the left upper  lobe, abutting the pleura. This is likely postinflammatory change, but  there are no prior exams for comparison.     D:  03/05/2019  E:  03/05/2019     This report was finalized on 3/5/2019 4:22 PM by Dr. Juwan Stock MD.       XR Chest 1 View [[id number]] Collected:  03/05/19 1143     Updated:  03/05/19 1336    Narrative:       EXAMINATION: XR CHEST 1 VW-      INDICATION: Shortness of air triage protocol.      COMPARISON: None.     FINDINGS: There is a left apical and lateral pneumothorax. There also  appears to be thickening of the more lateral parietal pleura. There are  extensive chronic pulmonary changes. The cardiac silhouette is normal.  There is no prior exam for comparison.           Impression:       There appears to be a left apical and lateral left  pneumothorax. There is also marked pleural thickening involving the more  lateral parietal pleura. There is no prior exam for comparison. A CT  scan would be helpful to further determine the size of the pneumothorax.     D:  03/05/2019  E:  03/05/2019     This report was finalized on 3/5/2019 1:34 PM by Dr. Juwan Stock MD.                  I have reviewed the medications:    Current Facility-Administered Medications:   •  acetaminophen (TYLENOL) tablet 650 mg, 650 mg, Oral, Q4H PRN, Tamanna Smith II, DO  •  doxycycline (MONODOX) capsule 100 mg, 100 mg, Oral,  Q12H, LuisTamanna M II, DO, 100 mg at 03/06/19 0849  •  HYDROcodone-acetaminophen (NORCO) 5-325 MG per tablet 1 tablet, 1 tablet, Oral, Q4H PRN, LuisTamanna M II, DO, 1 tablet at 03/05/19 2012  •  HYDROmorphone (DILAUDID) injection 0.5 mg, 0.5 mg, Intravenous, Q2H PRN **AND** naloxone (NARCAN) injection 0.4 mg, 0.4 mg, Intravenous, Q5 Min PRN, Luis, Tamanna M II, DO  •  ipratropium-albuterol (DUO-NEB) nebulizer solution 3 mL, 3 mL, Nebulization, Q4H PRN, Luis, Tamanna M II, DO  •  Magnesium Sulfate 2 gram Bolus, followed by 8 gram infusion (total Mg dose 10 grams)- Mg less than or equal to 1mg/dL, 2 g, Intravenous, PRN **OR** Magnesium Sulfate 2 gram / 50mL Infusion (GIVE X 3 BAGS TO EQUAL 6GM TOTAL DOSE) - Mg 1.1 - 1.5 mg/dl, 2 g, Intravenous, PRN **OR** Magnesium Sulfate 4 gram infusion- Mg 1.6-1.9 mg/dL, 4 g, Intravenous, PRN, LuisTamanna M II, DO  •  melatonin tablet 5 mg, 5 mg, Oral, Nightly PRN, Luis, Tamanna M II, DO  •  nicotine (NICODERM CQ) 21 MG/24HR patch 1 patch, 1 patch, Transdermal, Q24H, Yaya Pedroza PA-C, 1 patch at 03/06/19 0850  •  ondansetron (ZOFRAN) tablet 4 mg, 4 mg, Oral, Q6H PRN **OR** ondansetron (ZOFRAN) injection 4 mg, 4 mg, Intravenous, Q6H PRN, Luis, Tamanna M II, DO  •  Pharmacy Consult - [place], , Does not apply, Daily, Ellis Toth, [facility]  •  potassium chloride (MICRO-K) CR capsule 40 mEq, 40 mEq, Oral, PRN **OR** potassium chloride (KLOR-CON) packet 40 mEq, 40 mEq, Oral, PRN **OR** potassium chloride 10 mEq in 100 mL IVPB, 10 mEq, Intravenous, Q1H PRN, Tamanna Smith II, DO  •  predniSONE (DELTASONE) tablet 40 mg, 40 mg, Oral, Daily With Breakfast, Tamanna Smith II, DO, 40 mg at 03/06/19 0849  •  sennosides-docusate sodium (SENOKOT-S) 8.6-50 MG tablet 2 tablet, 2 tablet, Oral, BID PRN, Tamanna Smith II, DO  •  sodium chloride 0.9 % flush 10 mL, 10 mL, Intravenous, PRN, Rose Carney MD  •  sodium chloride 0.9 % flush 10 mL, 10 mL, Intravenous,  PRN, Rose Carney MD  •  sodium chloride 0.9 % flush 3 mL, 3 mL, Intravenous, Q12H, Tamanna Smith II, DO, 3 mL at 03/06/19 0852  •  sodium chloride 0.9 % flush 3-10 mL, 3-10 mL, Intravenous, PRN, Tamanna Smith II, DO      Assessment/Plan   Assessment / Plan     Active Hospital Problems    Diagnosis Date Noted   • COPD exacerbation (CMS/HCC) [J44.1] 03/05/2019   • Pneumonia due to infectious organism [J18.9] 03/05/2019   • Hypoxia [R09.02] 03/05/2019   • Tobacco abuse [Z72.0] 03/05/2019          Brief Hospital Course to date:  Luther Martines is a 63 y.o. male  with COPD presenting from home with progressive SOA x 4-5 days concerning for COPD exacerbation.  Afib/RVR noted on 3/6 AM     A/P:     Bacterial CAP MAYO  COPD exacerbation  Hypoxia with abnl CXR  --Initial concern in ED was for PTX based on CXR however CTA did not show this.   -  steroids, doxy day 2, nebs, O2 as needed.  - no labs this AM - rcheck  - walking on RA today.  Near baseline    New dx AFib/RVR  - dig x 2  - metoprolol 5mg IV x 1  - check echo  - CHADsVasc:  0 (though limited by minimal past medical care).  Check hgb A1c, watch for htn.     Gross hematuria on admission  Hist of renal stones but currently painless.  No urologist, no cystoscopy ever  - check KUB for stones  - avoid AC for now    CNS bacteremia  - presume contaminant  - continue doxy only     Tobacco abuse  --Needs to stop smoking. D/W patient. Provide NRT.     DVT prophylaxis: Mechanical.      Disposition: I expect the patient to be discharged tbd.    CODE STATUS:   Code Status and Medical Interventions:   Ordered at: 03/05/19 1312     Code Status:    CPR     Medical Interventions (Level of Support Prior to Arrest):    Full     Addendum - KUB shows no stones.  Consult urology; may need cysto either outpt or inpt.  Check urine cytology.     Electronically signed by Dulce Maria Jean Baptiste MD, 03/06/19, 9:29 AM.

## 2019-03-07 ENCOUNTER — APPOINTMENT (OUTPATIENT)
Dept: CT IMAGING | Facility: HOSPITAL | Age: 64
End: 2019-03-07

## 2019-03-07 PROBLEM — R31.9 HEMATURIA: Status: ACTIVE | Noted: 2019-03-07

## 2019-03-07 PROBLEM — I48.91 ATRIAL FIBRILLATION WITH RVR (HCC): Status: ACTIVE | Noted: 2019-03-07

## 2019-03-07 PROBLEM — I50.22 CHRONIC SYSTOLIC HEART FAILURE (HCC): Status: ACTIVE | Noted: 2019-03-07

## 2019-03-07 LAB
ANION GAP SERPL CALCULATED.3IONS-SCNC: 6 MMOL/L (ref 3–11)
BUN BLD-MCNC: 20 MG/DL (ref 9–23)
BUN/CREAT SERPL: 30.8 (ref 7–25)
CALCIUM SPEC-SCNC: 9 MG/DL (ref 8.7–10.4)
CHLORIDE SERPL-SCNC: 106 MMOL/L (ref 99–109)
CO2 SERPL-SCNC: 24 MMOL/L (ref 20–31)
CREAT BLD-MCNC: 0.65 MG/DL (ref 0.6–1.3)
DEPRECATED RDW RBC AUTO: 45.6 FL (ref 37–54)
ERYTHROCYTE [DISTWIDTH] IN BLOOD BY AUTOMATED COUNT: 14.4 % (ref 11.3–14.5)
GFR SERPL CREATININE-BSD FRML MDRD: 124 ML/MIN/1.73
GLUCOSE BLD-MCNC: 103 MG/DL (ref 70–100)
HBA1C MFR BLD: 5.4 % (ref 4.8–5.6)
HCT VFR BLD AUTO: 40.5 % (ref 38.9–50.9)
HGB BLD-MCNC: 13.2 G/DL (ref 13.1–17.5)
MAGNESIUM SERPL-MCNC: 2.3 MG/DL (ref 1.3–2.7)
MCH RBC QN AUTO: 28 PG (ref 27–31)
MCHC RBC AUTO-ENTMCNC: 32.6 G/DL (ref 32–36)
MCV RBC AUTO: 86 FL (ref 80–99)
PLATELET # BLD AUTO: 417 10*3/MM3 (ref 150–450)
PMV BLD AUTO: 9.7 FL (ref 6–12)
POTASSIUM BLD-SCNC: 4.2 MMOL/L (ref 3.5–5.5)
RBC # BLD AUTO: 4.71 10*6/MM3 (ref 4.2–5.76)
SODIUM BLD-SCNC: 136 MMOL/L (ref 132–146)
TROPONIN I SERPL-MCNC: <0.006 NG/ML
WBC NRBC COR # BLD: 13.05 10*3/MM3 (ref 3.5–10.8)

## 2019-03-07 PROCEDURE — 63710000001 PREDNISONE PER 1 MG: Performed by: INTERNAL MEDICINE

## 2019-03-07 PROCEDURE — 83036 HEMOGLOBIN GLYCOSYLATED A1C: CPT | Performed by: INTERNAL MEDICINE

## 2019-03-07 PROCEDURE — 94799 UNLISTED PULMONARY SVC/PX: CPT

## 2019-03-07 PROCEDURE — 84484 ASSAY OF TROPONIN QUANT: CPT | Performed by: INTERNAL MEDICINE

## 2019-03-07 PROCEDURE — 74176 CT ABD & PELVIS W/O CONTRAST: CPT

## 2019-03-07 PROCEDURE — 25010000002 METHYLPREDNISOLONE PER 40 MG: Performed by: INTERNAL MEDICINE

## 2019-03-07 PROCEDURE — 25010000002 DIPHENHYDRAMINE PER 50 MG: Performed by: INTERNAL MEDICINE

## 2019-03-07 PROCEDURE — 99233 SBSQ HOSP IP/OBS HIGH 50: CPT | Performed by: INTERNAL MEDICINE

## 2019-03-07 PROCEDURE — 25010000002 PROMETHAZINE PER 50 MG: Performed by: INTERNAL MEDICINE

## 2019-03-07 PROCEDURE — 87040 BLOOD CULTURE FOR BACTERIA: CPT | Performed by: INTERNAL MEDICINE

## 2019-03-07 PROCEDURE — 85027 COMPLETE CBC AUTOMATED: CPT | Performed by: INTERNAL MEDICINE

## 2019-03-07 PROCEDURE — 25010000002 KETOROLAC TROMETHAMINE PER 15 MG: Performed by: INTERNAL MEDICINE

## 2019-03-07 PROCEDURE — 80048 BASIC METABOLIC PNL TOTAL CA: CPT | Performed by: INTERNAL MEDICINE

## 2019-03-07 PROCEDURE — 97165 OT EVAL LOW COMPLEX 30 MIN: CPT

## 2019-03-07 PROCEDURE — 83735 ASSAY OF MAGNESIUM: CPT | Performed by: INTERNAL MEDICINE

## 2019-03-07 RX ORDER — IPRATROPIUM BROMIDE AND ALBUTEROL SULFATE 2.5; .5 MG/3ML; MG/3ML
3 SOLUTION RESPIRATORY (INHALATION)
Status: DISCONTINUED | OUTPATIENT
Start: 2019-03-07 | End: 2019-03-08 | Stop reason: HOSPADM

## 2019-03-07 RX ORDER — KETOROLAC TROMETHAMINE 30 MG/ML
30 INJECTION, SOLUTION INTRAMUSCULAR; INTRAVENOUS EVERY 6 HOURS PRN
Status: DISCONTINUED | OUTPATIENT
Start: 2019-03-07 | End: 2019-03-08 | Stop reason: HOSPADM

## 2019-03-07 RX ORDER — METHYLPREDNISOLONE SODIUM SUCCINATE 40 MG/ML
40 INJECTION, POWDER, LYOPHILIZED, FOR SOLUTION INTRAMUSCULAR; INTRAVENOUS EVERY 12 HOURS
Status: DISCONTINUED | OUTPATIENT
Start: 2019-03-07 | End: 2019-03-08 | Stop reason: HOSPADM

## 2019-03-07 RX ORDER — DIPHENHYDRAMINE HYDROCHLORIDE 50 MG/ML
25 INJECTION INTRAMUSCULAR; INTRAVENOUS ONCE
Status: COMPLETED | OUTPATIENT
Start: 2019-03-07 | End: 2019-03-07

## 2019-03-07 RX ORDER — OXYCODONE HYDROCHLORIDE AND ACETAMINOPHEN 5; 325 MG/1; MG/1
1 TABLET ORAL EVERY 4 HOURS PRN
Status: DISCONTINUED | OUTPATIENT
Start: 2019-03-07 | End: 2019-03-08 | Stop reason: HOSPADM

## 2019-03-07 RX ORDER — LISINOPRIL 5 MG/1
2.5 TABLET ORAL
Status: DISCONTINUED | OUTPATIENT
Start: 2019-03-07 | End: 2019-03-08 | Stop reason: HOSPADM

## 2019-03-07 RX ORDER — PROMETHAZINE HYDROCHLORIDE 25 MG/ML
25 INJECTION, SOLUTION INTRAMUSCULAR; INTRAVENOUS ONCE
Status: COMPLETED | OUTPATIENT
Start: 2019-03-07 | End: 2019-03-07

## 2019-03-07 RX ORDER — PROMETHAZINE HYDROCHLORIDE AND CODEINE PHOSPHATE 6.25; 1 MG/5ML; MG/5ML
5 SYRUP ORAL EVERY 4 HOURS PRN
Status: DISCONTINUED | OUTPATIENT
Start: 2019-03-07 | End: 2019-03-08 | Stop reason: HOSPADM

## 2019-03-07 RX ADMIN — NICOTINE 1 PATCH: 21 PATCH, EXTENDED RELEASE TRANSDERMAL at 08:39

## 2019-03-07 RX ADMIN — GUAIFENESIN AND DEXTROMETHORPHAN 5 ML: 100; 10 SYRUP ORAL at 08:40

## 2019-03-07 RX ADMIN — DOXYCYCLINE 100 MG: 100 CAPSULE ORAL at 08:39

## 2019-03-07 RX ADMIN — HYDROCODONE BITARTRATE AND ACETAMINOPHEN 1 TABLET: 5; 325 TABLET ORAL at 06:24

## 2019-03-07 RX ADMIN — DILTIAZEM HYDROCHLORIDE 180 MG: 180 CAPSULE, COATED, EXTENDED RELEASE ORAL at 08:40

## 2019-03-07 RX ADMIN — DOXYCYCLINE 100 MG: 100 CAPSULE ORAL at 21:03

## 2019-03-07 RX ADMIN — METHYLPREDNISOLONE SODIUM SUCCINATE 40 MG: 40 INJECTION, POWDER, FOR SOLUTION INTRAMUSCULAR; INTRAVENOUS at 21:03

## 2019-03-07 RX ADMIN — METHYLPREDNISOLONE SODIUM SUCCINATE 40 MG: 40 INJECTION, POWDER, FOR SOLUTION INTRAMUSCULAR; INTRAVENOUS at 12:40

## 2019-03-07 RX ADMIN — IPRATROPIUM BROMIDE AND ALBUTEROL SULFATE 3 ML: 2.5; .5 SOLUTION RESPIRATORY (INHALATION) at 09:37

## 2019-03-07 RX ADMIN — METOPROLOL TARTRATE 12.5 MG: 25 TABLET, FILM COATED ORAL at 12:41

## 2019-03-07 RX ADMIN — PREDNISONE 40 MG: 20 TABLET ORAL at 08:39

## 2019-03-07 RX ADMIN — KETOROLAC TROMETHAMINE 30 MG: 30 INJECTION, SOLUTION INTRAMUSCULAR at 12:49

## 2019-03-07 RX ADMIN — PROMETHAZINE HYDROCHLORIDE 25 MG: 25 INJECTION INTRAMUSCULAR; INTRAVENOUS at 12:40

## 2019-03-07 RX ADMIN — ACETAMINOPHEN 650 MG: 325 TABLET ORAL at 03:55

## 2019-03-07 RX ADMIN — SODIUM CHLORIDE, PRESERVATIVE FREE 3 ML: 5 INJECTION INTRAVENOUS at 08:40

## 2019-03-07 RX ADMIN — METOPROLOL TARTRATE 12.5 MG: 25 TABLET, FILM COATED ORAL at 21:03

## 2019-03-07 RX ADMIN — DIPHENHYDRAMINE HYDROCHLORIDE 25 MG: 50 INJECTION INTRAMUSCULAR; INTRAVENOUS at 12:40

## 2019-03-07 RX ADMIN — MELATONIN TAB 5 MG 5 MG: 5 TAB at 21:03

## 2019-03-07 RX ADMIN — SODIUM CHLORIDE, PRESERVATIVE FREE 3 ML: 5 INJECTION INTRAVENOUS at 21:03

## 2019-03-07 RX ADMIN — IPRATROPIUM BROMIDE AND ALBUTEROL SULFATE 3 ML: 2.5; .5 SOLUTION RESPIRATORY (INHALATION) at 15:20

## 2019-03-07 RX ADMIN — LISINOPRIL 2.5 MG: 5 TABLET ORAL at 12:40

## 2019-03-07 NOTE — PROGRESS NOTES
[facility] Medicine Services  PROGRESS NOTE    Patient Name: Luther Martines  : 1955  MRN: [id number]    Date of Admission: 3/5/2019  Length of Stay: 2  Primary Care Physician: Amrit Sosa MD    Subjective   Subjective     CC: f/u SOA    HPI: Up in chair with family in room. More SOA today. Also c/o HA.     Review of Systems  Gen- No fevers, chills  CV- No chest pain, palpitations  GI- No N/V/D, abd pain    Otherwise ROS is negative except as mentioned in the HPI.    Objective   Objective     Vital Signs:   Temp:  [97.5 °F (36.4 °C)-98.2 °F (36.8 °C)] 97.9 °F (36.6 °C)  Heart Rate:  [] 78  Resp:  [18-20] 20  BP: (104-146)/(66-99) 139/77        Physical Exam:  Constitutional: No acute distress, awake, alert, chronically ill appearing, up in chair  HENT: NCAT, mucous membranes moist  Respiratory: Slightly increased WOB, wheezes bilaterally  Cardiovascular: RRR, no murmurs, rubs, or gallops, palpable pedal pulses bilaterally  Gastrointestinal: Positive bowel sounds, soft, nontender, nondistended  Musculoskeletal: Atrophic left leg  Psychiatric: Appropriate affect, cooperative  Neurologic: Oriented x 3, strength symmetric in all extremities, Cranial Nerves grossly intact to confrontation, speech clear  Skin: No rashes    Results Reviewed:  I have personally reviewed current lab, radiology, and data and agree.    Results from last 7 days   Lab Units 19  0454 19  1127   WBC 10*3/mm3 13.05* 15.30*   HEMOGLOBIN g/dL 13.2 13.9   HEMATOCRIT % 40.5 43.7   PLATELETS 10*3/mm3 417 398     Results from last 7 days   Lab Units 19  0454 19  1159 19  1129   SODIUM mmol/L 136 135  --    POTASSIUM mmol/L 4.2 4.8  --    CHLORIDE mmol/L 106 102  --    CO2 mmol/L 24.0 28.0  --    BUN mg/dL 20 12  --    CREATININE mg/dL 0.65 0.85  --    GLUCOSE mg/dL 103* 99  --    CALCIUM mg/dL 9.0 8.9  --    ALT (SGPT) U/L  --  12  --    AST (SGOT) U/L  --  20  --    TROPONIN I  ng/mL <0.006  --  0.04     Estimated Creatinine Clearance: 87.4 mL/min (by C-G formula based on SCr of 0.65 mg/dL).    BNP   Date Value Ref Range Status   03/05/2019 70.0 0.0 - 100.0 pg/mL Final     Comment:     Results may be falsely decreased if patient taking Biotin.       Microbiology Results Abnormal     Procedure Component Value - Date/Time    Blood Culture - Blood, Arm, Right [[id number]]  (Abnormal) Collected:  03/05/19 1115    Lab Status:  Preliminary result Specimen:  Blood from Arm, Right Updated:  03/07/19 0752     Blood Culture Staphylococcus, coagulase negative     Isolated from Anaerobic Bottle     Gram Stain Anaerobic Bottle Gram positive cocci in groups    Narrative:       Refer to culture [id number] for ID and susceptibility    Blood Culture - Blood, Arm, Right [[id number]]  (Abnormal) Collected:  03/05/19 1130    Lab Status:  Preliminary result Specimen:  Blood from Arm, Right Updated:  03/07/19 0751     Blood Culture Staphylococcus, coagulase negative     Isolated from Aerobic Bottle     Gram Stain Aerobic Bottle Gram positive cocci in groups    Blood Culture ID, PCR - Blood, Arm, Right [[id number]]  (Abnormal) Collected:  03/05/19 1130    Lab Status:  Final result Specimen:  Blood from Arm, Right Updated:  03/06/19 1215     BCID, PCR Staphylococcus spp, not aureus. Identification by BCID PCR.    Influenza Antigen, Rapid - Swab, Nasopharynx [[id number]]  (Normal) Collected:  03/05/19 1126    Lab Status:  Final result Specimen:  Swab from Nasopharynx Updated:  03/05/19 1203     Influenza A Ag, EIA Negative     Influenza B Ag, EIA Negative        KUB personally reviewed without obvious nephrolithiasis. Agree with interpretation.   Imaging Results (last 24 hours)     Procedure Component Value Units Date/Time    XR Abdomen KUB [[id number]] Collected:  03/06/19 1040     Updated:  03/06/19 1444    Narrative:       EXAMINATION: XR ABDOMEN, KUB-03/06/2019:      INDICATION: Hematuria.  Evaluate for stones;  J93.9-Pneumothorax,  unspecified; R09.02-Hypoxemia.      COMPARISON: NONE.     FINDINGS: KUB reveals stool scattered throughout the colon. No evidence  of obvious obstruction or gross free intraperitoneal air. No definite  abnormal calcification seen overlying the renal shadows or paths of the  ureters.           Impression:       No gross abnormality seen overlying the paths of the ureters  or renal shadows. No abnormal calcification. Bowel gas pattern is  nonspecific with stool in the colon.     D:  03/06/2019  E:  03/06/2019     This report was finalized on 3/6/2019 2:42 PM by Dr. Rosamaria Hess MD.             Results for orders placed during the hospital encounter of 03/05/19   Adult Transthoracic Echo Complete W/ Cont if Necessary Per Protocol    Narrative · Left ventricular systolic function is mildly decreased.  · EF appears to be in the range of 46 - 50%  · All left ventricular wall segments contract normally.  · Normal right ventricular cavity size, wall thickness, systolic function   and septal motion noted.  · Trace tricuspid valve regurgitation is present. Estimated right   ventricular systolic pressure from tricuspid regurgitation is normal (<35   mmHg).  · Patient in atrial fibrillation throughout the exam          I have reviewed the medications:    Current Facility-Administered Medications:   •  acetaminophen (TYLENOL) tablet 650 mg, 650 mg, Oral, Q4H PRN, Tamanna Smith II, DO, 650 mg at 03/07/19 0355  •  diltiaZEM CD (CARDIZEM CD) 24 hr capsule 180 mg, 180 mg, Oral, Q24H, Dulce Maria Jean Baptiste MD, 180 mg at 03/07/19 0840  •  diphenhydrAMINE (BENADRYL) injection 25 mg, 25 mg, Intravenous, Once, Tamanna Smith II, DO  •  doxycycline (MONODOX) capsule 100 mg, 100 mg, Oral, Q12H, Tamanna Smith II, DO, 100 mg at 03/07/19 0839  •  guaifenesin-dextromethorphan (ROBITUSSIN DM) 100-10 MG/5ML syrup 5 mL, 5 mL, Oral, Q4H PRN, Dulce Maria Jean Baptiste MD, 5 mL at 03/07/19 0840  •  HYDROmorphone (DILAUDID)  injection 0.5 mg, 0.5 mg, Intravenous, Q2H PRN **AND** naloxone (NARCAN) injection 0.4 mg, 0.4 mg, Intravenous, Q5 Min PRN, Luis, Tamanna M II, DO  •  ipratropium-albuterol (DUO-NEB) nebulizer solution 3 mL, 3 mL, Nebulization, Q4H - RT, Luis, Tamanna M II, DO  •  ketorolac (TORADOL) injection 30 mg, 30 mg, Intravenous, Q6H PRN, Luis, Tamanna M II, DO  •  Magnesium Sulfate 2 gram Bolus, followed by 8 gram infusion (total Mg dose 10 grams)- Mg less than or equal to 1mg/dL, 2 g, Intravenous, PRN **OR** Magnesium Sulfate 2 gram / 50mL Infusion (GIVE X 3 BAGS TO EQUAL 6GM TOTAL DOSE) - Mg 1.1 - 1.5 mg/dl, 2 g, Intravenous, PRN **OR** Magnesium Sulfate 4 gram infusion- Mg 1.6-1.9 mg/dL, 4 g, Intravenous, PRN, Luis, Tamanna M II, DO  •  melatonin tablet 5 mg, 5 mg, Oral, Nightly PRN, Luis, Tamanna M II, DO, 5 mg at 03/06/19 1958  •  methylPREDNISolone sodium succinate (SOLU-Medrol) injection 40 mg, 40 mg, Intravenous, Q12H, Luis, Tamanna M II, DO  •  nicotine (NICODERM CQ) 21 MG/24HR patch 1 patch, 1 patch, Transdermal, Q24H, Yaya Pedroza PA-C, 1 patch at 03/07/19 0839  •  ondansetron (ZOFRAN) tablet 4 mg, 4 mg, Oral, Q6H PRN **OR** ondansetron (ZOFRAN) injection 4 mg, 4 mg, Intravenous, Q6H PRN, Luis, Tamanna M II, DO  •  oxyCODONE-acetaminophen (PERCOCET) 5-325 MG per tablet 1 tablet, 1 tablet, Oral, Q4H PRN, Luis, Tamanna M II, DO  •  Pharmacy Consult - MTM, , Does not apply, Daily, Ellis Toth, RPH  •  potassium chloride (MICRO-K) CR capsule 40 mEq, 40 mEq, Oral, PRN **OR** potassium chloride (KLOR-CON) packet 40 mEq, 40 mEq, Oral, PRN **OR** potassium chloride 10 mEq in 100 mL IVPB, 10 mEq, Intravenous, Q1H PRN, Tamanna Smith M II, DO  •  promethazine (PHENERGAN) injection 25 mg, 25 mg, Intravenous, Once, Tamanna Smith II, DO  •  promethazine-codeine (PHENERGAN with CODEINE) 6.25-10 MG/5ML syrup 5 mL, 5 mL, Oral, Q4H PRN, Tamanna Smith M II, DO  •  sennosides-docusate sodium (SENOKOT-S)  8.6-50 MG tablet 2 tablet, 2 tablet, Oral, BID PRN, Luis Tamanna M II, DO  •  sodium chloride 0.9 % flush 10 mL, 10 mL, Intravenous, PRN, Rose Carney MD  •  sodium chloride 0.9 % flush 10 mL, 10 mL, Intravenous, PRN, Rose Carney MD  •  sodium chloride 0.9 % flush 3 mL, 3 mL, Intravenous, Q12H, Luis Tamanna M II, DO, 3 mL at 03/07/19 0840  •  sodium chloride 0.9 % flush 3-10 mL, 3-10 mL, Intravenous, PRN, Luis Tamanna M II, DO      Assessment/Plan   Assessment / Plan     Active Hospital Problems    Diagnosis Date Noted   • Hematuria [R31.9] 03/07/2019   • Atrial fibrillation with RVR (CMS/[facility]) [I48.91] 03/07/2019   • COPD exacerbation (CMS/[facility]) [J44.1] 03/05/2019   • Pneumonia due to infectious organism [J18.9] 03/05/2019   • Hypoxia [R09.02] 03/05/2019   • Tobacco abuse [Z72.0] 03/05/2019       Brief Hospital Course to date:  Luther Martines is a 63 y.o. male  with COPD presenting from home with progressive SOA x 4-5 days concerning for COPD exacerbation.  He developed Afib/RVR on 3/6 AM which spontaneously converted back to NSR on 3/7. Also noted to have gross hematuria during his stay.    A/P:     Bacterial CAP MAYO  COPD exacerbation  Hypoxia with abnl CXR  - Initial concern in ED was for PTX based on CXR however CTA did not show this.   - Add IV steroids and schedule nebs. Continue doxy and O2 as needed.  - Cough suppression.     New dx AFib/RVR  - Likely driven by above. Has converted to NSR.  - Echo with mildly reduced EF. No valvular lesions noted.  - CHADsVasc: 1-2 (though limited by minimal past medical care). No AC for now given hematuria.     Newly diagnosed systolic heart failure  - Unclear chronicity. Needs outpatient ischemic eval once above has been resolved.   - Medical therapy with asa, metoprolol, ace inhibitor.   - Monitor volume status closely.    Gross hematuria on admission  Hist of renal stones but currently painless.  - Urology has seen. CT A/P pending. Cystoscopy as  outpt once above has resolved.   - Avoid AC for now    HA, chronic  - Migraine cocktail x 1. Switch to Percocet     Coag neg staph bacteremia 2/2 bottles.  - Likely a contaminant. Repeat blood cultures today.  - Continue doxy only for respiratory illness.     Tobacco abuse  --Needs to stop smoking. D/W patient. Provide NRT.     DVT prophylaxis: Mechanical.     Disposition: I expect the patient to be discharged home in 1-3 days.      CODE STATUS:   Code Status and Medical Interventions:   Ordered at: 03/05/19 1312     Code Status:    CPR     Medical Interventions (Level of Support Prior to Arrest):    Full         Electronically signed by Tamanna Smith II, DO, 03/07/19, 11:24 AM.

## 2019-03-07 NOTE — PLAN OF CARE
Problem: Patient Care Overview  Goal: Plan of Care Review  Outcome: Ongoing (interventions implemented as appropriate)   03/07/19 1173   Coping/Psychosocial   Plan of Care Reviewed With patient   OTHER   Outcome Summary Pt with PMH and current symptoms and has decreased I with ADL's. Pt declined OOB this date due to drowsiness, fatigue. Will see pt to address decreased endurance and I.

## 2019-03-07 NOTE — PLAN OF CARE
Problem: Patient Care Overview  Goal: Plan of Care Review  Outcome: Ongoing (interventions implemented as appropriate)   03/07/19 0127   Coping/Psychosocial   Plan of Care Reviewed With patient   Plan of Care Review   Progress improving   OTHER   Outcome Summary patient back on 2L, c/o SOA at times, urology to see
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
## 2019-03-07 NOTE — PROGRESS NOTES
Continued Stay Note  [facility]     Patient Name: Luther Martines  MRN: [id number]  Today's Date: 3/7/2019    Admit Date: 3/5/2019    Discharge Plan     Row Name 03/07/19 165       Plan    Plan  Home with home health    Patient/Family in Agreement with Plan  yes    Plan Comments  Spoke with patient's wife at bedside.  Patient resting today.  Patient's 02 increased to 2L overnight.  Urology to see today for consult.  Plan is home with home health for now.  Case management will continue to follow for discharge planning.      Final Discharge Disposition Code  06 - home with home health care        Discharge Codes    No documentation.             Oneida Chacon RN

## 2019-03-07 NOTE — CONSULTS
Consults    Patient Care Team:  Amrit Sosa MD as PCP - General (Internal Medicine)    Chief complaint: intermittent  Zion hematuria    Subjective     History of Present Illness   PT with long smoking hx, admitted with COPD flare up reports gross hematuria for several months, lasting 1-2 days and resolving spontaneously. No dysuria, He reports stones in the past but recent episodes  Are pain free. No voiding complaints.     Review of Systems     Past Medical History:   Diagnosis Date   • Hypertension    • Seizures (CMS/HCC)        Objective   Alert , thin, no cva tenderness  Reviewed ct scan of chest , partial view of kidneys unremarkable.    Vital Signs  Temp:  [97.3 °F (36.3 °C)-98.2 °F (36.8 °C)] 98 °F (36.7 °C)  Heart Rate:  [] 65  Resp:  [18-20] 18  BP: ()/() 140/83    Physical Exam    Results Review:    I reviewed the patient's new clinical results.        Assessment/Plan       COPD exacerbation (CMS/HCC)    Pneumonia due to infectious organism    Hypoxia    Tobacco abuse      Assessment & Plan  Gross hmeaturia , discussed need for evaluation, will get non contrasted ct scan of abd while here and anticipate cystoscopy as outpt.  I discussed the patients findings and my recommendations with patient    Anam Poe MD  03/07/19  7:58 AM    Time:

## 2019-03-08 VITALS
TEMPERATURE: 97.8 F | WEIGHT: 117 LBS | HEART RATE: 77 BPM | BODY MASS INDEX: 18.8 KG/M2 | SYSTOLIC BLOOD PRESSURE: 138 MMHG | DIASTOLIC BLOOD PRESSURE: 73 MMHG | RESPIRATION RATE: 16 BRPM | OXYGEN SATURATION: 94 % | HEIGHT: 66 IN

## 2019-03-08 PROBLEM — J44.1 COPD EXACERBATION (HCC): Status: RESOLVED | Noted: 2019-03-05 | Resolved: 2019-03-08

## 2019-03-08 PROBLEM — N32.89 BLADDER MASS: Status: ACTIVE | Noted: 2019-03-08

## 2019-03-08 PROBLEM — R09.02 HYPOXIA: Status: RESOLVED | Noted: 2019-03-05 | Resolved: 2019-03-08

## 2019-03-08 LAB
LAB AP CASE REPORT: NORMAL
PATH REPORT.FINAL DX SPEC: NORMAL

## 2019-03-08 PROCEDURE — 94799 UNLISTED PULMONARY SVC/PX: CPT

## 2019-03-08 PROCEDURE — 97162 PT EVAL MOD COMPLEX 30 MIN: CPT

## 2019-03-08 PROCEDURE — 25010000002 METHYLPREDNISOLONE PER 40 MG: Performed by: INTERNAL MEDICINE

## 2019-03-08 PROCEDURE — 99239 HOSP IP/OBS DSCHRG MGMT >30: CPT | Performed by: FAMILY MEDICINE

## 2019-03-08 RX ORDER — PREDNISONE 20 MG/1
20 TABLET ORAL DAILY
Qty: 5 TABLET | Refills: 0 | Status: SHIPPED | OUTPATIENT
Start: 2019-03-08 | End: 2019-03-13

## 2019-03-08 RX ORDER — OXYCODONE HYDROCHLORIDE AND ACETAMINOPHEN 5; 325 MG/1; MG/1
1 TABLET ORAL EVERY 4 HOURS PRN
Qty: 15 TABLET | Refills: 0 | Status: SHIPPED | OUTPATIENT
Start: 2019-03-08 | End: 2019-03-17

## 2019-03-08 RX ORDER — LISINOPRIL 2.5 MG/1
2.5 TABLET ORAL
Qty: 30 TABLET | Refills: 1 | Status: SHIPPED | OUTPATIENT
Start: 2019-03-09 | End: 2019-05-15 | Stop reason: SDUPTHER

## 2019-03-08 RX ORDER — DOXYCYCLINE 100 MG/1
100 CAPSULE ORAL EVERY 12 HOURS SCHEDULED
Qty: 7 CAPSULE | Refills: 0 | Status: SHIPPED | OUTPATIENT
Start: 2019-03-08 | End: 2019-03-12

## 2019-03-08 RX ORDER — DILTIAZEM HYDROCHLORIDE 180 MG/1
180 CAPSULE, COATED, EXTENDED RELEASE ORAL
Qty: 30 CAPSULE | Refills: 1 | Status: SHIPPED | OUTPATIENT
Start: 2019-03-09 | End: 2019-05-15 | Stop reason: SDUPTHER

## 2019-03-08 RX ADMIN — METOPROLOL TARTRATE 12.5 MG: 25 TABLET, FILM COATED ORAL at 09:04

## 2019-03-08 RX ADMIN — LISINOPRIL 2.5 MG: 5 TABLET ORAL at 09:03

## 2019-03-08 RX ADMIN — NICOTINE 1 PATCH: 21 PATCH, EXTENDED RELEASE TRANSDERMAL at 09:00

## 2019-03-08 RX ADMIN — DILTIAZEM HYDROCHLORIDE 180 MG: 180 CAPSULE, COATED, EXTENDED RELEASE ORAL at 09:03

## 2019-03-08 RX ADMIN — IPRATROPIUM BROMIDE AND ALBUTEROL SULFATE 3 ML: 2.5; .5 SOLUTION RESPIRATORY (INHALATION) at 12:13

## 2019-03-08 RX ADMIN — DOXYCYCLINE 100 MG: 100 CAPSULE ORAL at 09:04

## 2019-03-08 RX ADMIN — METHYLPREDNISOLONE SODIUM SUCCINATE 40 MG: 40 INJECTION, POWDER, FOR SOLUTION INTRAMUSCULAR; INTRAVENOUS at 12:45

## 2019-03-08 RX ADMIN — IPRATROPIUM BROMIDE AND ALBUTEROL SULFATE 3 ML: 2.5; .5 SOLUTION RESPIRATORY (INHALATION) at 02:59

## 2019-03-08 RX ADMIN — SODIUM CHLORIDE, PRESERVATIVE FREE 3 ML: 5 INJECTION INTRAVENOUS at 09:09

## 2019-03-08 NOTE — PLAN OF CARE
Problem: Patient Care Overview  Goal: Plan of Care Review  Outcome: Ongoing (interventions implemented as appropriate)   03/08/19 0231   Coping/Psychosocial   Plan of Care Reviewed With patient   Plan of Care Review   Progress no change   OTHER   Outcome Summary Patient had a peacful night
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
## 2019-03-08 NOTE — PROGRESS NOTES
Case Management Discharge Note    Final Note: Spoke with patient at bedside.  Patient discharging home today with family.  Patient declines home health.  Has DME in home and declines additional equipment at this time.  Patient will need cont. 02 for discharge.  Spoke with Yajaira at [facility].  She has verified patient insurance and address., and has accepted referral for 02.  Order sent along with qualifying sats, discharge summary and H and P to [facility].  [facility] will deliver portable tanks for discharge home, and will deliver concentrator to patient's home this evening for cont. 02 setup and use.  Family will transport patient home in private vehicle.  Urology appointment scheduled for outpatient.      Destination      No service has been selected for the patient.      Durable Medical Equipment - Selection Complete      Service Provider Request Status Selected Services Address Phone Number Fax Number    [facility] Selected Durable Medical Equipment [id number] [address] [phone]      Dialysis/Infusion      No service has been selected for the patient.      Home Medical Care      No service has been selected for the patient.      Community Resources      No service has been selected for the patient.             Final Discharge Disposition Code: 01 - home or self-care

## 2019-03-08 NOTE — DISCHARGE SUMMARY
[facility] Medicine Services  DISCHARGE SUMMARY    Patient Name: Luther Martines  : 1955  MRN: [id number]    Date of Admission: 3/5/2019  Date of Discharge:  19    Primary Care Physician: Amrit Sosa MD    Consults     No orders found from 2019 to 3/6/2019.          Hospital Course     Presenting Problem:   Pneumothorax [J93.9]    Active Hospital Problems    Diagnosis  POA   • Bladder mass [N32.89]  Yes   • Hematuria [R31.9]  Yes   • Atrial fibrillation with RVR (CMS/HCC) [I48.91]  Yes   • Chronic systolic heart failure (CMS/HCC) [I50.22]  Yes   • Pneumonia due to infectious organism [J18.9]  Yes   • Tobacco abuse [Z72.0]  Yes      Resolved Hospital Problems    Diagnosis Date Resolved POA   • COPD exacerbation (CMS/HCC) [J44.1] 2019 Yes   • Hypoxia [R09.02] 2019 Yes          Hospital Course:  Luther Martines is a 63 y.o. male with COPD presented from home with progressive SOA x 4-5 days concerning for COPD exacerbation and found to have left upper lobe community-acquired pneumonia.  He also developed Afib/RVR on 3/6/19 AM which spontaneously converted back to NSR on 3/7/19.  Anticoagulation was deferred due to gross hematuria noted during this hospitalization, evaluation by urology included imaging which has confirmed a new diagnosis of bladder mass.     Bacterial CAP MAYO  COPD exacerbation  Hypoxia with abnl CXR  - Initial concern in ED was for PTX based on CXR however CTA did not show this but did confirm left upper lobe infiltrate.   - Improved after steroids, nebs, doxycycline course.     Coag neg staph bacteremia 2/2 bottles.  - Likely a contaminant. Repeat blood cultures today.  - Continue doxy only for respiratory illness.    New dx AFib/RVR  - Likely driven by above. Has converted to NSR.  - Echo with mildly reduced EF. No valvular lesions noted.  - CHADsVasc: 1-2 (though limited by minimal past medical care). No AC for now given hematuria d/t new  bladder mass.      Newly diagnosed chronic systolic heart failure  -PCP to give consideration for outpatient ischemic eval once above has been resolved.   - Medical therapy with asa, metoprolol, ace inhibitor.   - Monitor volume status closely, no overt need to initiate loop diuresis currently based on volume status.     Gross hematuria on admission  New diagnosis bladder mass concerning for malignancy  - Urology planning for outpt TURBT      Tobacco abuse  -Tobacco cessation counseling, likely largest risk factor for new diagnosis bladder carcinoma          Discharge Follow Up Recommendations for labs/diagnostics:  Dr. Poe of urology's office is scheduling TURBT for next week to address 3 cm bladder mass, they will be in contact with patient and family for further instructions.    Day of Discharge     HPI:   Feels near baseline dyspnea.  Family and patient aware of Urology plans to contact them for operative planning next week. No new complaints.     ROS:  Otherwise ROS is negative except as mentioned in the HPI.    Vital Signs:         Physical Exam:  Constitutional: No acute distress, awake, alert, nontoxic, thin body habitus  Respiratory: Scant wheezes but mostly clear without any rales, good effort, nonlabored respirations   Cardiovascular: RRR, no murmur  Gastrointestinal: Positive bowel sounds, soft, nontender, nondistended  Musculoskeletal: No peripheral edema, normal muscle tone for age  Psychiatric: Appropriate affect, good insight and judgement, cooperative      Pertinent  and/or Most Recent Results     Results from last 7 days   Lab Units 03/07/19  0454   WBC 10*3/mm3 13.05*   HEMOGLOBIN g/dL 13.2   HEMATOCRIT % 40.5   PLATELETS 10*3/mm3 417   SODIUM mmol/L 136   POTASSIUM mmol/L 4.2   CHLORIDE mmol/L 106   CO2 mmol/L 24.0   BUN mg/dL 20   CREATININE mg/dL 0.65   GLUCOSE mg/dL 103*   CALCIUM mg/dL 9.0           Invalid input(s): PROT, LABALBU        Invalid input(s): TG, LDLCALC, LDLREALC  Results  from last 7 days   Lab Units 03/07/19  0454 03/06/19  1233   TSH mIU/mL  --  0.400   HEMOGLOBIN A1C % 5.40  --    TROPONIN I ng/mL <0.006  --      Brief Urine Lab Results  (Last result in the past 365 days)      Color   Clarity   Blood   Leuk Est   Nitrite   Protein   CREAT   Urine HCG        03/05/19 1458 Red Cloudy Large (3+) Trace Negative Negative               Microbiology Results Abnormal     Procedure Component Value - Date/Time    Blood Culture - Blood, Arm, Right [[id number]] Collected:  03/07/19 1206    Lab Status:  Final result Specimen:  Blood from Arm, Right Updated:  03/12/19 1315     Blood Culture No growth at 5 days    Blood Culture - Blood, Arm, Left [[id number]] Collected:  03/07/19 1207    Lab Status:  Final result Specimen:  Blood from Arm, Left Updated:  03/12/19 1315     Blood Culture No growth at 5 days    Blood Culture - Blood, Arm, Right [[id number]]  (Abnormal)  (Susceptibility) Collected:  03/05/19 1130    Lab Status:  Final result Specimen:  Blood from Arm, Right Updated:  03/11/19 1423     Blood Culture Staphylococcus epidermidis     Isolated from Aerobic Bottle     Gram Stain Aerobic Bottle Gram positive cocci in groups    Susceptibility      Staphylococcus epidermidis     ALIZA     Clindamycin Susceptible     Daptomycin Susceptible     Erythromycin Susceptible     Gentamicin Susceptible     Levofloxacin Susceptible [1]      Linezolid Susceptible     Oxacillin Susceptible     Penicillin G Resistant     Rifampin Susceptible     Tetracycline Susceptible     Trimethoprim + Sulfamethoxazole Susceptible     Vancomycin Susceptible            [1]   Staphylococcus species may develop resistance during prolonged therapy with quinolones.  Isolates that are initially susceptible may become resistant within three to four days after initiation of therapy. Testing of repeat isolates may be warranted.                 Blood Culture - Blood, Arm, Right [[id number]]  (Abnormal) Collected:  03/05/19 1115    Lab  Status:  Final result Specimen:  Blood from Arm, Right Updated:  03/11/19 1416     Blood Culture Staphylococcus, coagulase negative     Isolated from Anaerobic Bottle     Gram Stain Anaerobic Bottle Gram positive cocci in groups    Narrative:       Refer to culture [id number] for ID and susceptibility    Blood Culture ID, PCR - Blood, Arm, Right [[id number]]  (Abnormal) Collected:  03/05/19 1130    Lab Status:  Final result Specimen:  Blood from Arm, Right Updated:  03/06/19 1215     BCID, PCR Staphylococcus spp, not aureus. Identification by BCID PCR.    Influenza Antigen, Rapid - Swab, Nasopharynx [[id number]]  (Normal) Collected:  03/05/19 1126    Lab Status:  Final result Specimen:  Swab from Nasopharynx Updated:  03/05/19 1203     Influenza A Ag, EIA Negative     Influenza B Ag, EIA Negative          Imaging Results (all)     Procedure Component Value Units Date/Time    CT Abdomen Pelvis Without Contrast [[id number]] Collected:  03/07/19 1208     Updated:  03/07/19 1601    Narrative:       EXAMINATION: CT ABDOMEN AND PELVIS WO CONTRAST-03/07/2019:      INDICATION: Urolithiasis, hematuria; J93.9-Pneumothorax, unspecified;  R09.02-Hypoxemia.      TECHNIQUE: CT abdomen and pelvis without intravenous contrast.     The radiation dose reduction device was turned on for each scan per the  ALARA (As Low as Reasonably Achievable) protocol.     COMPARISON: NONE.     FINDINGS: The lung bases demonstrates atelectasis and scarring, greatest  in the left lower lobe, without focal consolidation or effusion. Liver  without focal lesion. Gallbladder unremarkable. No biliary dilatation.  Pancreas and spleen grossly unremarkable. Adrenals without distinct  nodule. Kidneys without hydronephrosis or hydroureter demonstrating  extrarenal pelvis on the right. No obstructive uropathy or urolithiasis  is evident. Urinary bladder demonstrates an abnormal soft tissue  attenuation focus in the left posterolateral portion of the  bladder  measuring up to 3.4 x 2.5 x 2.9 cm concerning for soft tissue malignancy  especially in the setting of hematuria. GI tract without focal  thickening or disproportionate dilatation of bowel. No free fluid or  intra-abdominal free air. Pelvic viscera are otherwise unremarkable  without bulky pelvic adenopathy or free fluid in the pelvis.  Degenerative changes of the spine without aggressive osseous or soft  tissue body wall lesions of concern.       Impression:       Abnormal soft tissue mass along the left posterolateral  margin, left urinary bladder, measuring 3.4 x 2.5 x 2.9 cm concerning  for malignancy in the setting of hematuria without urolithiasis or  nephrolithiasis otherwise noted. Cystoscopy recommended for further  evaluation.     D:  03/07/2019  E:  03/07/2019           This report was finalized on 3/7/2019 3:59 PM by Dr. Efren Vieira.       XR Abdomen KUB [[id number]] Collected:  03/06/19 1040     Updated:  03/06/19 1444    Narrative:       EXAMINATION: XR ABDOMEN, KUB-03/06/2019:      INDICATION: Hematuria.  Evaluate for stones; J93.9-Pneumothorax,  unspecified; R09.02-Hypoxemia.      COMPARISON: NONE.     FINDINGS: KUB reveals stool scattered throughout the colon. No evidence  of obvious obstruction or gross free intraperitoneal air. No definite  abnormal calcification seen overlying the renal shadows or paths of the  ureters.           Impression:       No gross abnormality seen overlying the paths of the ureters  or renal shadows. No abnormal calcification. Bowel gas pattern is  nonspecific with stool in the colon.     D:  03/06/2019  E:  03/06/2019     This report was finalized on 3/6/2019 2:42 PM by Dr. Rosamaria Hess MD.       CT Angiogram Chest With Contrast [[id number]] Collected:  03/05/19 1413     Updated:  03/05/19 1633    Narrative:       EXAMINATION: CT ANGIOGRAM CHEST WITH CONTRAST-03/05/2019:      INDICATION: Dyspnea/pneumothorax.         TECHNIQUE: CT angiogram of the chest  was performed following bolus  infusion of contrast. Images are displayed in the axial and coronal  projections (2-D).     The radiation dose reduction device was turned on for each scan per the  ALARA (As Low as Reasonably Achievable) protocol.     COMPARISON: Chest x-ray of the same day.     FINDINGS: There is no axillary lymphadenopathy. There is no significant  mediastinal or hilar adenopathy. There is no pulmonary embolus. There is  no pericardial effusion. Limited images of the upper abdomen are normal.  Images displayed at lung window settings fail to confirm the presence of  a pneumothorax. There are dense pleural calcifications which in the AP  projection could simulate a pneumothorax. There is a small focal  somewhat nodular area of airspace disease in the left upper lobe  laterally. There is no consolidation. There are chronic obstructive  findings.       Impression:       1. There is no evidence of pulmonary embolus.  2. There is no pneumothorax. There are dense pleural calcifications  anteriorly which can simulate a pneumothorax on a chest x-ray.  3. There is a small somewhat nodular airspace process in the left upper  lobe, abutting the pleura. This is likely postinflammatory change, but  there are no prior exams for comparison.     D:  03/05/2019  E:  03/05/2019     This report was finalized on 3/5/2019 4:22 PM by Dr. Juwan Stock MD.       XR Chest 1 View [[id number]] Collected:  03/05/19 1143     Updated:  03/05/19 1336    Narrative:       EXAMINATION: XR CHEST 1 VW-      INDICATION: Shortness of air triage protocol.      COMPARISON: None.     FINDINGS: There is a left apical and lateral pneumothorax. There also  appears to be thickening of the more lateral parietal pleura. There are  extensive chronic pulmonary changes. The cardiac silhouette is normal.  There is no prior exam for comparison.           Impression:       There appears to be a left apical and lateral left  pneumothorax. There is also  marked pleural thickening involving the more  lateral parietal pleura. There is no prior exam for comparison. A CT  scan would be helpful to further determine the size of the pneumothorax.     D:  03/05/2019  E:  03/05/2019     This report was finalized on 3/5/2019 1:34 PM by Dr. Juwan Stock MD.                       Results for orders placed during the hospital encounter of 03/05/19   Adult Transthoracic Echo Complete W/ Cont if Necessary Per Protocol    Narrative · Left ventricular systolic function is mildly decreased.  · EF appears to be in the range of 46 - 50%  · All left ventricular wall segments contract normally.  · Normal right ventricular cavity size, wall thickness, systolic function   and septal motion noted.  · Trace tricuspid valve regurgitation is present. Estimated right   ventricular systolic pressure from tricuspid regurgitation is normal (<35   mmHg).  · Patient in atrial fibrillation throughout the exam            Discharge Details        Discharge Medications      New Medications      Instructions Start Date   diltiaZEM  MG 24 hr capsule  Commonly known as:  CARDIZEM CD   180 mg, Oral, Every 24 Hours Scheduled      lisinopril 2.5 MG tablet  Commonly known as:  PRINIVIL,ZESTRIL   2.5 mg, Oral, Every 24 Hours Scheduled      metoprolol tartrate 25 MG tablet  Commonly known as:  LOPRESSOR   12.5 mg, Oral, Every 12 Hours Scheduled      oxyCODONE-acetaminophen 5-325 MG per tablet  Commonly known as:  PERCOCET   1 tablet, Oral, Every 4 Hours PRN      predniSONE 20 MG tablet  Commonly known as:  DELTASONE   20 mg, Oral, Daily         Continue These Medications      Instructions Start Date   acetaminophen 500 MG tablet  Commonly known as:  TYLENOL   500 mg, Oral, Every 6 Hours PRN         ASK your doctor about these medications      Instructions Start Date   doxycycline 100 MG capsule  Commonly known as:  MONODOX  Ask about: Should I take this medication?   100 mg, Oral, Every 12 Hours  Scheduled             Allergies   Allergen Reactions   • Morphine Anxiety         CODE STATUS:    Code Status and Medical Interventions:   Ordered at: 03/05/19 1312     Code Status:    CPR     Medical Interventions (Level of Support Prior to Arrest):    Full       No future appointments.    Additional Instructions for the Follow-ups that You Need to Schedule     Discharge Follow-up with PCP   As directed       Currently Documented PCP:    Amrit Sosa MD    PCP Phone Number:    [phone]     Follow Up Details:  within 2 weeks of d/c for Transition of Care visit         Discharge Follow-up with Specialty: Please call Dr. Poe urology office and ask them to set up planned outpt cystoscopy for bladder mass with hematuria so we can give patient this information at d/c, reccomended by Dr. Poe in consult note   As directed      Specialty:  Please call Dr. Poe urology office and ask them to set up planned outpt cystoscopy for bladder mass with hematuria so we can give patient this information at d/c, reccomended by Dr. Poe in consult note                 Time Spent on Discharge:  45 minutes    Electronically signed by Ashley Conner MD, 03/08/19, 2:11 PM.

## 2019-03-08 NOTE — PROGRESS NOTES
Pt and wife at bedside. Ct scn reviewed with them, no stones or hydro but 3cm left base of bladder mass, discussed TURBT . Then potential additional rx. OK to discharge home today from  standpoint, I will arrange for TURBT next week. May or may not need post op catheter and may need to stay overnight from pulmonary .

## 2019-03-08 NOTE — DISCHARGE PLACEMENT REQUEST
"[facility]acon  [phone]  Order for oxygen    Lu Escobedo (63 y.o. Male)     Date of Birth Social Security Number Address Home Phone MRN    1955  [address] [place] [address] [phone] [id number]    Faith Marital Status          Catholic        Admission Date Admission Type Admitting Provider Attending Provider Department, Room/Bed    3/5/19 Emergency Ashley Conner MD Hall, Holly, MD [facility] 4H, S470/1    Discharge Date Discharge Disposition Discharge Destination                       Attending Provider:  sAhley Conner MD    Allergies:  Morphine    Isolation:  None   Infection:  None   Code Status:  CPR    Ht:  167.6 cm (66\")   Wt:  53.1 kg (117 lb)    Admission Cmt:  None   Principal Problem:  None                Active Insurance as of 3/5/2019     Primary Coverage     Payor Plan Insurance Group Employer/Plan Group    HUMANA MEDICARE REPLACEMENT HUMANA MEDICARE REPL [id number]     Payor Plan Address Payor Plan Phone Number Payor Plan Fax Number Effective Dates    [address] [address] [phone]  6/1/2018 - None Entered    [facility] [id number]       Subscriber Name Subscriber Birth Date Member ID       LU ESCOBEDO 1955 [id number]                 Emergency Contacts      (Rel.) Home Phone Work Phone Mobile Phone    Champ Escobedo (Spouse) [phone] -- --    Merlin Escobedo (Son) -- -- [phone]    debi escobedo (Son) -- -- [phone]        03/08/19 1138  --  78  --  --  nasal cannula  94   03/08/19 1137  --  77  --  --  nasal cannula  91   03/08/19 1135  --  --  --  --  room air   85 Abnormal    Device (Oxygen Therapy): walking in hallway at 03/08/19 1135   03/08/19 1134  --  84  --  --  room air   83 Abnormal    Device (Oxygen Therapy): walking in hallway at 03/08/19 1134   03/08/19 1010  --  --  --  --  nasal cannula  --   03/08/19 0905  97.8 (36.6)  76  16  138/73  nasal cannula  94   03/08/19 0900  --  69  --  --  nasal cannula  94          "   History & Physical      Tamanna Smith II, DO at 3/5/2019  2:03 PM              [facility] Medicine Services  HISTORY AND PHYSICAL    Patient Name: Luther Martines  : 1955  MRN: [id number]  Primary Care Physician: Amrit Sosa MD  Date of admission: 3/5/2019      Subjective   Subjective     Chief Complaint: SOA    HPI:  Luther Martines is a 63 y.o. male coming into ED with 4-5 days of worsening SOA. This has gradually worsened over this time period. Has not improved with his home albuterol treatments and IV decadron which he received at outside [facility]. He has had ongoing productive cough since this time. No f/c, chest pain.    Review of Systems   Gen- No fevers, chills  CV- No chest pain, palpitations  GI- No N/V/D, abd pain    Otherwise complete ROS reviewed and is negative except as mentioned in the HPI.    Personal History     Past Medical History:   Diagnosis Date   • Seizures (CMS/HCC)        Past Surgical History:   Procedure Laterality Date   • BRAIN SURGERY         Family History: family history is not on file. Otherwise pertinent FHx was reviewed and unremarkable.     Social History:  reports that he has been smoking.  He has been smoking about 2.00 packs per day. He does not have any smokeless tobacco history on file. He reports that he does not drink alcohol or use drugs.  Social History     Social History Narrative   • Not on file       Medications:    Available home medication information reviewed.    (Not in a hospital admission)    Allergies   Allergen Reactions   • Morphine Anxiety       Objective   Objective     Vital Signs:   Temp:  [98 °F (36.7 °C)-99.7 °F (37.6 °C)] 99.7 °F (37.6 °C)  Heart Rate:  [] 90  Resp:  [20-26] 20  BP: (125-184)/() 125/82        Physical Exam   Constitutional: No acute distress, awake, alert  Eyes: PERRLA, sclerae anicteric, no conjunctival injection  HENT: NCAT, mucous membranes moist  Neck: Supple, no thyromegaly, no  lymphadenopathy, trachea midline  Respiratory: Normal WOB, wheezes bilaterally  Cardiovascular: RRR, no murmurs, rubs, or gallops, palpable pedal pulses bilaterally  Gastrointestinal: Positive bowel sounds, soft, nontender, nondistended  Musculoskeletal: LLE significantly smaller than RLE which is chronic  Psychiatric: Appropriate affect, cooperative  Neurologic: Oriented x 3, strength symmetric in all extremities, Cranial Nerves grossly intact to confrontation, speech clear  Skin: No rashes    Results Reviewed:  I have personally reviewed current lab, radiology, and data and agree.        Results from last 7 days   Lab Units 03/05/19  1159 03/05/19  1129   SODIUM mmol/L 135  --    POTASSIUM mmol/L 4.8  --    CHLORIDE mmol/L 102  --    CO2 mmol/L 28.0  --    BUN mg/dL 12  --    CREATININE mg/dL 0.85  --    GLUCOSE mg/dL 99  --    CALCIUM mg/dL 8.9  --    ALT (SGPT) U/L 12  --    AST (SGOT) U/L 20  --    TROPONIN I ng/mL  --  0.04     Estimated Creatinine Clearance: 66.8 mL/min (by C-G formula based on SCr of 0.85 mg/dL).  Brief Urine Lab Results     None        BNP   Date Value Ref Range Status   03/05/2019 70.0 0.0 - 100.0 pg/mL Final     Comment:     Results may be falsely decreased if patient taking Biotin.     CTA chest personally reviewed without PTX by my review. Will await final interpretation.   Imaging Results (last 24 hours)     Procedure Component Value Units Date/Time    XR Chest 1 View [[id number]] Collected:  03/05/19 1143     Updated:  03/05/19 1336    Narrative:       EXAMINATION: XR CHEST 1 VW-      INDICATION: Shortness of air triage protocol.      COMPARISON: None.     FINDINGS: There is a left apical and lateral pneumothorax. There also  appears to be thickening of the more lateral parietal pleura. There are  extensive chronic pulmonary changes. The cardiac silhouette is normal.  There is no prior exam for comparison.           Impression:       There appears to be a left apical and lateral  left  pneumothorax. There is also marked pleural thickening involving the more  lateral parietal pleura. There is no prior exam for comparison. A CT  scan would be helpful to further determine the size of the pneumothorax.     D:  03/05/2019  E:  03/05/2019     This report was finalized on 3/5/2019 1:34 PM by Dr. Juwan Stock MD.       CT Angiogram Chest With Contrast [[id number]] Updated:  03/05/19 1310             Assessment/Plan   Assessment / Plan     Active Hospital Problems    Diagnosis Date Noted   • COPD exacerbation (CMS/HCC) [J44.1] 03/05/2019   • Pneumonia due to infectious organism [J18.9] 03/05/2019   • Hypoxia [R09.02] 03/05/2019   • Tobacco abuse [Z72.0] 03/05/2019     64 y/o male with COPD presenting from home with progressive SOA x 4-5 days concerning for COPD exacerbation.    A/P:    Bacterial CAP  COPD exacerbation  Hypoxia with abnl CXR  --Initial concern in ED was for PTX based on CXR however CTA did not show this. Will treat for PNA/COPD exacerbation with steroids, doxy, nebs, O2 as needed.    Tobacco abuse  --Needs to stop smoking. D/W patient. Provide NRT.    DVT prophylaxis: Mechanical.    CODE STATUS:    Code Status and Medical Interventions:   Ordered at: 03/05/19 1312     Code Status:    CPR     Medical Interventions (Level of Support Prior to Arrest):    Full       Admission Status:  I believe this patient meets OBSERVATION status, however if further evaluation or treatment plans warrant, status may change.  Based upon current information, I predict patient's care encounter to be less than or equal to 2 midnights.    Electronically signed by Tamanna Smith II, DO, 03/05/19, 2:04 PM.        Electronically signed by Tamanna Smith II, DO at 3/5/2019  2:11 PM       Discharge Summary     No notes of this type exist for this encounter.

## 2019-03-08 NOTE — DISCHARGE PLACEMENT REQUEST
"Oneida Cahcon  [phone]  Order for oxygen    Lu Escobedo (63 y.o. Male)     Date of Birth Social Security Number Address Home Phone MRN    1955  [address] [place] [address] [phone] [id number]    Alevism Marital Status          Jew        Admission Date Admission Type Admitting Provider Attending Provider Department, Room/Bed    3/5/19 Emergency Ashley Conner MD Hall, Holly, MD [facility] 4H, S470/1    Discharge Date Discharge Disposition Discharge Destination                       Attending Provider:  Ashley Conner MD    Allergies:  Morphine    Isolation:  None   Infection:  None   Code Status:  CPR    Ht:  167.6 cm (66\")   Wt:  53.1 kg (117 lb)    Admission Cmt:  None   Principal Problem:  None                Active Insurance as of 3/5/2019     Primary Coverage     Payor Plan Insurance Group Employer/Plan Group    HUMANA MEDICARE REPLACEMENT HUMANA MEDICARE REPL [id number]     Payor Plan Address Payor Plan Phone Number Payor Plan Fax Number Effective Dates    [address] [address] [phone]  6/1/2018 - None Entered    [facility] [id number]       Subscriber Name Subscriber Birth Date Member ID       LU ESCOBEDO 1955 [id number]                 Emergency Contacts      (Rel.) Home Phone Work Phone Mobile Phone    Champ Escobedo (Spouse) [phone] -- --    Merlin Escobedo (Son) -- -- [phone]    debi escobedo (Son) -- -- [phone]        03/08/19 1202  --  67  --  --  nasal cannula  93   03/08/19 1138  --  78  --  --  nasal cannula  94   03/08/19 1137  --  77  --  --  nasal cannula  91   03/08/19 1135  --  --  --  --  room air   85 Abnormal    Device (Oxygen Therapy): walking in hallway at 03/08/19 1135   03/08/19 1134  --  84  --  --  room air   83 Abnormal    Device (Oxygen Therapy): walking in hallway at 03/08/19 1134   03/08/19 1010  --  --  --  --  nasal cannula  --   03/08/19 0905  97.8 (36.6)  76  16  138/73  nasal cannula  94   03/08/19 0900 " " --  69  --  --  nasal cannula  94   19 0600  --  --  --  --  room air  --     [address] [address]  Dept. Phone:  [phone]  Dept. Fax:   Date Ordered: Mar 8, 2019         Patient:  Luther Martines MRN:  [id number]   [address] [place] [address] :  1955  SSN:    Phone: [phone] Sex:  M     Weight: 53.1 kg (117 lb)         Ht Readings from Last 1 Encounters:   19 167.6 cm (66\")         Oxygen Therapy         (Order ID: [id number])    Diagnosis:  Pneumothorax, unspecified type (J93.9 [ICD-10-CM] 512.89 [ICD-9-CM])  Hypoxia (R09.02 [ICD-10-CM] 799.02 [ICD-9-CM])  COPD exacerbation (CMS/HCC) (J44.1 [ICD-10-CM] 491.21 [ICD-9-CM])  Pneumonia of lower lobe due to infectious organism, unspecified laterality (CMS/HCC) (J18.1 [ICD-10-CM] 483.8 [ICD-9-CM])  Chronic systolic heart failure (CMS/HCC) (I50.22 [ICD-10-CM] 428.22 [ICD-9-CM])   Quantity:  1     Delivery Modality: Nasal Cannula  Liters Per Minute: 2  Duration: Continuous  Equipment:  Oxygen Concentrator &  &  Portable Gaseous Oxygen System & Portable Oxygen Contents Gaseous &  Conserving Regulator  The face to face evaluation was performed on: 3/8/2019  Length of Need (99 Months = Lifetime): 99 Months = Lifetime        Authorizing Provider's Phone: [phone]   Verbal Order Mode: Telephone with readback   Authorizing Provider: Ashley Conner MD  Authorizing Provider's NPI: [id number]     Order Entered By: Oneida Chacon RN 3/8/2019  2:09 PM                    History & Physical      Tamanna Smith II, DO at 3/5/2019  2:03 PM              [facility] Medicine Services  HISTORY AND PHYSICAL    Patient Name: Luther Martines  : 1955  MRN: [id number]  Primary Care Physician: Amrit Sosa MD  Date of admission: 3/5/2019      Subjective   Subjective     Chief Complaint: SOA    HPI:  Luther Martines is a 63 y.o. male coming " into ED with 4-5 days of worsening SOA. This has gradually worsened over this time period. Has not improved with his home albuterol treatments and IV decadron which he received at outside [facility]. He has had ongoing productive cough since this time. No f/c, chest pain.    Review of Systems   Gen- No fevers, chills  CV- No chest pain, palpitations  GI- No N/V/D, abd pain    Otherwise complete ROS reviewed and is negative except as mentioned in the HPI.    Personal History     Past Medical History:   Diagnosis Date   • Seizures (CMS/HCC)        Past Surgical History:   Procedure Laterality Date   • BRAIN SURGERY         Family History: family history is not on file. Otherwise pertinent FHx was reviewed and unremarkable.     Social History:  reports that he has been smoking.  He has been smoking about 2.00 packs per day. He does not have any smokeless tobacco history on file. He reports that he does not drink alcohol or use drugs.  Social History     Social History Narrative   • Not on file       Medications:    Available home medication information reviewed.    (Not in a hospital admission)    Allergies   Allergen Reactions   • Morphine Anxiety       Objective   Objective     Vital Signs:   Temp:  [98 °F (36.7 °C)-99.7 °F (37.6 °C)] 99.7 °F (37.6 °C)  Heart Rate:  [] 90  Resp:  [20-26] 20  BP: (125-184)/() 125/82        Physical Exam   Constitutional: No acute distress, awake, alert  Eyes: PERRLA, sclerae anicteric, no conjunctival injection  HENT: NCAT, mucous membranes moist  Neck: Supple, no thyromegaly, no lymphadenopathy, trachea midline  Respiratory: Normal WOB, wheezes bilaterally  Cardiovascular: RRR, no murmurs, rubs, or gallops, palpable pedal pulses bilaterally  Gastrointestinal: Positive bowel sounds, soft, nontender, nondistended  Musculoskeletal: LLE significantly smaller than RLE which is chronic  Psychiatric: Appropriate affect, cooperative  Neurologic: Oriented x 3, strength symmetric in all  extremities, Cranial Nerves grossly intact to confrontation, speech clear  Skin: No rashes    Results Reviewed:  I have personally reviewed current lab, radiology, and data and agree.        Results from last 7 days   Lab Units 03/05/19  1159 03/05/19  1129   SODIUM mmol/L 135  --    POTASSIUM mmol/L 4.8  --    CHLORIDE mmol/L 102  --    CO2 mmol/L 28.0  --    BUN mg/dL 12  --    CREATININE mg/dL 0.85  --    GLUCOSE mg/dL 99  --    CALCIUM mg/dL 8.9  --    ALT (SGPT) U/L 12  --    AST (SGOT) U/L 20  --    TROPONIN I ng/mL  --  0.04     Estimated Creatinine Clearance: 66.8 mL/min (by C-G formula based on SCr of 0.85 mg/dL).  Brief Urine Lab Results     None        BNP   Date Value Ref Range Status   03/05/2019 70.0 0.0 - 100.0 pg/mL Final     Comment:     Results may be falsely decreased if patient taking Biotin.     CTA chest personally reviewed without PTX by my review. Will await final interpretation.   Imaging Results (last 24 hours)     Procedure Component Value Units Date/Time    XR Chest 1 View [[id number]] Collected:  03/05/19 1143     Updated:  03/05/19 1336    Narrative:       EXAMINATION: XR CHEST 1 VW-      INDICATION: Shortness of air triage protocol.      COMPARISON: None.     FINDINGS: There is a left apical and lateral pneumothorax. There also  appears to be thickening of the more lateral parietal pleura. There are  extensive chronic pulmonary changes. The cardiac silhouette is normal.  There is no prior exam for comparison.           Impression:       There appears to be a left apical and lateral left  pneumothorax. There is also marked pleural thickening involving the more  lateral parietal pleura. There is no prior exam for comparison. A CT  scan would be helpful to further determine the size of the pneumothorax.     D:  03/05/2019  E:  03/05/2019     This report was finalized on 3/5/2019 1:34 PM by Dr. Juwan Stock MD.       CT Angiogram Chest With Contrast [[id number]] Updated:  03/05/19 1310              Assessment/Plan   Assessment / Plan     Active Hospital Problems    Diagnosis Date Noted   • COPD exacerbation (CMS/HCC) [J44.1] 03/05/2019   • Pneumonia due to infectious organism [J18.9] 03/05/2019   • Hypoxia [R09.02] 03/05/2019   • Tobacco abuse [Z72.0] 03/05/2019     62 y/o male with COPD presenting from home with progressive SOA x 4-5 days concerning for COPD exacerbation.    A/P:    Bacterial CAP  COPD exacerbation  Hypoxia with abnl CXR  --Initial concern in ED was for PTX based on CXR however CTA did not show this. Will treat for PNA/COPD exacerbation with steroids, doxy, nebs, O2 as needed.    Tobacco abuse  --Needs to stop smoking. D/W patient. Provide NRT.    DVT prophylaxis: Mechanical.    CODE STATUS:    Code Status and Medical Interventions:   Ordered at: 03/05/19 1312     Code Status:    CPR     Medical Interventions (Level of Support Prior to Arrest):    Full       Admission Status:  I believe this patient meets OBSERVATION status, however if further evaluation or treatment plans warrant, status may change.  Based upon current information, I predict patient's care encounter to be less than or equal to 2 midnights.    Electronically signed by Tamanna Smith II, DO, 03/05/19, 2:04 PM.        Electronically signed by Tamanna Smith II, DO at 3/5/2019  2:11 PM       Discharge Summary     No notes of this type exist for this encounter.

## 2019-03-08 NOTE — PLAN OF CARE
Problem: Patient Care Overview  Goal: Plan of Care Review  Outcome: Ongoing (interventions implemented as appropriate)   03/08/19 9461   Coping/Psychosocial   Plan of Care Reviewed With patient   Plan of Care Review   Progress improving   OTHER   Outcome Summary Pt ambulates x 350 ft in kerr with RWx and HHA (usually uses cane at home). Pt SpO2 is sustained at appropriate level with 2L per NC carmen. Pt expected to be able to d/c home when medically appropriate with supplemental O2 if needed.

## 2019-03-09 ENCOUNTER — READMISSION MANAGEMENT (OUTPATIENT)
Dept: CALL CENTER | Facility: HOSPITAL | Age: 64
End: 2019-03-09

## 2019-03-09 NOTE — OUTREACH NOTE
Prep Survey      Responses   Facility patient discharged from?  [facility]   Is patient eligible?  Yes   Discharge diagnosis  COPD exacerbation   Pnuemonia   Does the patient have one of the following disease processes/diagnoses(primary or secondary)?  COPD/Pneumonia   Does the patient have Home health ordered?  No   Is there a DME ordered?  No   Prep survey completed?  Yes          Tiara Hillman RN

## 2019-03-11 LAB
BACTERIA SPEC AEROBE CULT: ABNORMAL
BACTERIA SPEC AEROBE CULT: ABNORMAL
GRAM STN SPEC: ABNORMAL
GRAM STN SPEC: ABNORMAL
ISOLATED FROM: ABNORMAL
ISOLATED FROM: ABNORMAL

## 2019-03-12 LAB
BACTERIA SPEC AEROBE CULT: NORMAL
BACTERIA SPEC AEROBE CULT: NORMAL

## 2019-03-13 ENCOUNTER — READMISSION MANAGEMENT (OUTPATIENT)
Dept: CALL CENTER | Facility: HOSPITAL | Age: 64
End: 2019-03-13

## 2019-03-13 NOTE — OUTREACH NOTE
COPD/PN Week 1 Survey      Responses   Facility patient discharged from?  [facility]   Does the patient have one of the following disease processes/diagnoses(primary or secondary)?  COPD/Pneumonia   Is there a successful TCM telephone encounter documented?  No   Was the primary reason for admission:  COPD exacerbation   Week 1 attempt successful?  Yes   Call start time  0733   Call end time  0740   Discharge diagnosis  COPD exacerbation   Pnuemonia   Is patient permission given to speak with other caregiver?  Yes   List who call center can speak with  wife   Meds reviewed with patient/caregiver?  Yes   Is the patient having any side effects they believe may be caused by any medication additions or changes?  Yes   Side effects comments   inability to sleep.   Does the patient have all medications ordered at discharge?  Yes   Is the patient taking all medications as directed (includes completed medication regime)?  Yes   Comments regarding appointments  Pt to see pcp next week.   Does the patient have a primary care provider?   Yes   Does the patient have an appointment with their PCP or pulmonologist within 7 days of discharge?  Greater than 7 days   What is preventing the patient from scheduling follow up appointments within 7 days of discharge?  Earlier appointment not available   Nursing Interventions  Verified appointment date/time/provider   Has the patient kept scheduled appointments due by today?  N/A   Has home health visited the patient within 72 hours of discharge?  N/A   What DME was ordered?  oxygen   Has all DME been delivered?  Yes   Psychosocial issues?  No   Did the patient receive a copy of their discharge instructions?  Yes   What is the patient's perception of their health status since discharge?  Improving   Are the patient's immunizations up to date?   Yes   Is the patient/caregiver able to teach back the hierarchy of who to call/visit for symptoms/problems? PCP, Specialist, Home health nurse,  Urgent Care, ED, 911  Yes   Is the patient able to teach back COPD zones?  Yes   Nursing interventions  Education provided on various zones   Patient reports what zone on this call?  Green Zone   Green Zone  Reports doing well, Breathing without shortness of breath, Usual activity and exercise level   Green Zone interventions:  Take daily medications, Use oxygen as prescribed, Do not smoke   Week 1 call completed?  Yes          Nanci Renya RN

## 2019-03-14 ENCOUNTER — ANESTHESIA EVENT (OUTPATIENT)
Dept: PERIOP | Facility: HOSPITAL | Age: 64
End: 2019-03-14

## 2019-03-14 RX ORDER — FAMOTIDINE 10 MG/ML
20 INJECTION, SOLUTION INTRAVENOUS ONCE
Status: CANCELLED | OUTPATIENT
Start: 2019-03-14 | End: 2019-03-14

## 2019-03-15 ENCOUNTER — HOSPITAL ENCOUNTER (OUTPATIENT)
Facility: HOSPITAL | Age: 64
Setting detail: HOSPITAL OUTPATIENT SURGERY
Discharge: HOME | End: 2019-03-15
Attending: UROLOGY | Admitting: UROLOGY

## 2019-03-15 ENCOUNTER — ANESTHESIA (OUTPATIENT)
Dept: PERIOP | Facility: HOSPITAL | Age: 64
End: 2019-03-15

## 2019-03-15 ENCOUNTER — HOSPITAL ENCOUNTER (EMERGENCY)
Facility: HOSPITAL | Age: 64
Discharge: HOME | End: 2019-03-15
Attending: EMERGENCY MEDICINE | Admitting: EMERGENCY MEDICINE

## 2019-03-15 VITALS
HEART RATE: 69 BPM | DIASTOLIC BLOOD PRESSURE: 77 MMHG | HEIGHT: 66 IN | BODY MASS INDEX: 18.8 KG/M2 | WEIGHT: 117 LBS | TEMPERATURE: 97.2 F | SYSTOLIC BLOOD PRESSURE: 135 MMHG | OXYGEN SATURATION: 98 % | RESPIRATION RATE: 12 BRPM

## 2019-03-15 VITALS
SYSTOLIC BLOOD PRESSURE: 138 MMHG | RESPIRATION RATE: 18 BRPM | TEMPERATURE: 97.4 F | BODY MASS INDEX: 18.8 KG/M2 | WEIGHT: 117 LBS | HEIGHT: 66 IN | HEART RATE: 85 BPM | OXYGEN SATURATION: 95 % | DIASTOLIC BLOOD PRESSURE: 64 MMHG

## 2019-03-15 DIAGNOSIS — D49.4 BLADDER TUMOR: ICD-10-CM

## 2019-03-15 DIAGNOSIS — N32.89 BLADDER MASS: ICD-10-CM

## 2019-03-15 DIAGNOSIS — G89.18 POST-OP PAIN: Primary | ICD-10-CM

## 2019-03-15 DIAGNOSIS — R31.0 GROSS HEMATURIA: Primary | ICD-10-CM

## 2019-03-15 LAB
DEPRECATED RDW RBC AUTO: 47.6 FL (ref 37–54)
ERYTHROCYTE [DISTWIDTH] IN BLOOD BY AUTOMATED COUNT: 15.1 % (ref 11.3–14.5)
HCT VFR BLD AUTO: 47.1 % (ref 38.9–50.9)
HGB BLD-MCNC: 15 G/DL (ref 13.1–17.5)
MCH RBC QN AUTO: 27.7 PG (ref 27–31)
MCHC RBC AUTO-ENTMCNC: 31.8 G/DL (ref 32–36)
MCV RBC AUTO: 87.1 FL (ref 80–99)
PLATELET # BLD AUTO: 367 10*3/MM3 (ref 150–450)
PMV BLD AUTO: 9.4 FL (ref 6–12)
POTASSIUM BLDA-SCNC: 4.5 MMOL/L (ref 3.5–5.3)
RBC # BLD AUTO: 5.41 10*6/MM3 (ref 4.2–5.76)
WBC NRBC COR # BLD: 19.33 10*3/MM3 (ref 3.5–10.8)

## 2019-03-15 PROCEDURE — 96372 THER/PROPH/DIAG INJ SC/IM: CPT

## 2019-03-15 PROCEDURE — 85027 COMPLETE CBC AUTOMATED: CPT | Performed by: UROLOGY

## 2019-03-15 PROCEDURE — 88307 TISSUE EXAM BY PATHOLOGIST: CPT | Performed by: UROLOGY

## 2019-03-15 PROCEDURE — 25010000002 PHENYLEPHRINE PER 1 ML: Performed by: NURSE ANESTHETIST, CERTIFIED REGISTERED

## 2019-03-15 PROCEDURE — 25010000002 ONDANSETRON PER 1 MG: Performed by: NURSE ANESTHETIST, CERTIFIED REGISTERED

## 2019-03-15 PROCEDURE — 25010000002 PROPOFOL 10 MG/ML EMULSION: Performed by: NURSE ANESTHETIST, CERTIFIED REGISTERED

## 2019-03-15 PROCEDURE — 25010000002 FENTANYL CITRATE (PF) 100 MCG/2ML SOLUTION: Performed by: NURSE ANESTHETIST, CERTIFIED REGISTERED

## 2019-03-15 PROCEDURE — 25010000002 HYDROMORPHONE 1 MG/ML SOLUTION: Performed by: EMERGENCY MEDICINE

## 2019-03-15 PROCEDURE — 25010000002 NEOSTIGMINE 10 MG/10ML SOLUTION: Performed by: NURSE ANESTHETIST, CERTIFIED REGISTERED

## 2019-03-15 PROCEDURE — 25010000002 MIDAZOLAM PER 1 MG: Performed by: NURSE ANESTHETIST, CERTIFIED REGISTERED

## 2019-03-15 PROCEDURE — 25010000002 CEFOXITIN PER 1 G: Performed by: UROLOGY

## 2019-03-15 PROCEDURE — 94640 AIRWAY INHALATION TREATMENT: CPT

## 2019-03-15 PROCEDURE — 99283 EMERGENCY DEPT VISIT LOW MDM: CPT

## 2019-03-15 PROCEDURE — 84132 ASSAY OF SERUM POTASSIUM: CPT | Performed by: ANESTHESIOLOGY

## 2019-03-15 RX ORDER — FENTANYL CITRATE 50 UG/ML
50 INJECTION, SOLUTION INTRAMUSCULAR; INTRAVENOUS
Status: DISCONTINUED | OUTPATIENT
Start: 2019-03-15 | End: 2019-03-15 | Stop reason: HOSPADM

## 2019-03-15 RX ORDER — ASPIRIN 81 MG/1
81 TABLET ORAL DAILY PRN
COMMUNITY
End: 2019-07-11

## 2019-03-15 RX ORDER — IPRATROPIUM BROMIDE AND ALBUTEROL SULFATE 2.5; .5 MG/3ML; MG/3ML
3 SOLUTION RESPIRATORY (INHALATION) ONCE
Status: COMPLETED | OUTPATIENT
Start: 2019-03-15 | End: 2019-03-15

## 2019-03-15 RX ORDER — SODIUM CHLORIDE 0.9 % (FLUSH) 0.9 %
3 SYRINGE (ML) INJECTION EVERY 12 HOURS SCHEDULED
Status: DISCONTINUED | OUTPATIENT
Start: 2019-03-15 | End: 2019-03-15 | Stop reason: HOSPADM

## 2019-03-15 RX ORDER — LIDOCAINE HYDROCHLORIDE 10 MG/ML
0.5 INJECTION, SOLUTION EPIDURAL; INFILTRATION; INTRACAUDAL; PERINEURAL ONCE AS NEEDED
Status: COMPLETED | OUTPATIENT
Start: 2019-03-15 | End: 2019-03-15

## 2019-03-15 RX ORDER — LIDOCAINE HYDROCHLORIDE 10 MG/ML
INJECTION, SOLUTION EPIDURAL; INFILTRATION; INTRACAUDAL; PERINEURAL AS NEEDED
Status: DISCONTINUED | OUTPATIENT
Start: 2019-03-15 | End: 2019-03-15 | Stop reason: SURG

## 2019-03-15 RX ORDER — PROPOFOL 10 MG/ML
VIAL (ML) INTRAVENOUS AS NEEDED
Status: DISCONTINUED | OUTPATIENT
Start: 2019-03-15 | End: 2019-03-15 | Stop reason: SURG

## 2019-03-15 RX ORDER — ROCURONIUM BROMIDE 10 MG/ML
INJECTION, SOLUTION INTRAVENOUS AS NEEDED
Status: DISCONTINUED | OUTPATIENT
Start: 2019-03-15 | End: 2019-03-15 | Stop reason: SURG

## 2019-03-15 RX ORDER — HYDROMORPHONE HYDROCHLORIDE 1 MG/ML
0.5 INJECTION, SOLUTION INTRAMUSCULAR; INTRAVENOUS; SUBCUTANEOUS
Status: DISCONTINUED | OUTPATIENT
Start: 2019-03-15 | End: 2019-03-15 | Stop reason: HOSPADM

## 2019-03-15 RX ORDER — FAMOTIDINE 20 MG/1
20 TABLET, FILM COATED ORAL ONCE
Status: COMPLETED | OUTPATIENT
Start: 2019-03-15 | End: 2019-03-15

## 2019-03-15 RX ORDER — FENTANYL CITRATE 50 UG/ML
INJECTION, SOLUTION INTRAMUSCULAR; INTRAVENOUS AS NEEDED
Status: DISCONTINUED | OUTPATIENT
Start: 2019-03-15 | End: 2019-03-15 | Stop reason: SURG

## 2019-03-15 RX ORDER — HYDROCODONE BITARTRATE AND ACETAMINOPHEN 7.5; 325 MG/1; MG/1
1-2 TABLET ORAL EVERY 4 HOURS PRN
Qty: 10 TABLET | Refills: 0 | OUTPATIENT
Start: 2019-03-15 | End: 2019-03-15

## 2019-03-15 RX ORDER — MAGNESIUM HYDROXIDE 1200 MG/15ML
LIQUID ORAL AS NEEDED
Status: DISCONTINUED | OUTPATIENT
Start: 2019-03-15 | End: 2019-03-15 | Stop reason: HOSPADM

## 2019-03-15 RX ORDER — NEOSTIGMINE METHYLSULFATE 1 MG/ML
INJECTION, SOLUTION INTRAVENOUS AS NEEDED
Status: DISCONTINUED | OUTPATIENT
Start: 2019-03-15 | End: 2019-03-15 | Stop reason: SURG

## 2019-03-15 RX ORDER — SODIUM CHLORIDE, SODIUM LACTATE, POTASSIUM CHLORIDE, CALCIUM CHLORIDE 600; 310; 30; 20 MG/100ML; MG/100ML; MG/100ML; MG/100ML
9 INJECTION, SOLUTION INTRAVENOUS CONTINUOUS
Status: DISCONTINUED | OUTPATIENT
Start: 2019-03-15 | End: 2019-03-15 | Stop reason: HOSPADM

## 2019-03-15 RX ORDER — GLYCOPYRROLATE 0.2 MG/ML
INJECTION INTRAMUSCULAR; INTRAVENOUS AS NEEDED
Status: DISCONTINUED | OUTPATIENT
Start: 2019-03-15 | End: 2019-03-15 | Stop reason: SURG

## 2019-03-15 RX ORDER — MIDAZOLAM HYDROCHLORIDE 1 MG/ML
INJECTION INTRAMUSCULAR; INTRAVENOUS AS NEEDED
Status: DISCONTINUED | OUTPATIENT
Start: 2019-03-15 | End: 2019-03-15 | Stop reason: SURG

## 2019-03-15 RX ORDER — ONDANSETRON 2 MG/ML
INJECTION INTRAMUSCULAR; INTRAVENOUS AS NEEDED
Status: DISCONTINUED | OUTPATIENT
Start: 2019-03-15 | End: 2019-03-15 | Stop reason: SURG

## 2019-03-15 RX ORDER — SODIUM CHLORIDE 0.9 % (FLUSH) 0.9 %
3-10 SYRINGE (ML) INJECTION AS NEEDED
Status: DISCONTINUED | OUTPATIENT
Start: 2019-03-15 | End: 2019-03-15 | Stop reason: HOSPADM

## 2019-03-15 RX ADMIN — PROPOFOL 100 MG: 10 INJECTION, EMULSION INTRAVENOUS at 08:26

## 2019-03-15 RX ADMIN — LIDOCAINE HYDROCHLORIDE 50 MG: 10 INJECTION, SOLUTION EPIDURAL; INFILTRATION; INTRACAUDAL; PERINEURAL at 08:26

## 2019-03-15 RX ADMIN — SODIUM CHLORIDE, POTASSIUM CHLORIDE, SODIUM LACTATE AND CALCIUM CHLORIDE 9 ML/HR: 600; 310; 30; 20 INJECTION, SOLUTION INTRAVENOUS at 08:07

## 2019-03-15 RX ADMIN — FENTANYL CITRATE 50 MCG: 50 INJECTION INTRAMUSCULAR; INTRAVENOUS at 10:30

## 2019-03-15 RX ADMIN — LIDOCAINE HYDROCHLORIDE 0.3 ML: 10 INJECTION, SOLUTION EPIDURAL; INFILTRATION; INTRACAUDAL; PERINEURAL at 08:05

## 2019-03-15 RX ADMIN — HYDROMORPHONE HYDROCHLORIDE 1 MG: 1 INJECTION, SOLUTION INTRAMUSCULAR; INTRAVENOUS; SUBCUTANEOUS at 14:42

## 2019-03-15 RX ADMIN — FENTANYL CITRATE 25 MCG: 50 INJECTION, SOLUTION INTRAMUSCULAR; INTRAVENOUS at 09:04

## 2019-03-15 RX ADMIN — FENTANYL CITRATE 50 MCG: 50 INJECTION, SOLUTION INTRAMUSCULAR; INTRAVENOUS at 08:26

## 2019-03-15 RX ADMIN — MIDAZOLAM HYDROCHLORIDE 2 MG: 1 INJECTION, SOLUTION INTRAMUSCULAR; INTRAVENOUS at 08:21

## 2019-03-15 RX ADMIN — PHENYLEPHRINE HYDROCHLORIDE 100 MCG: 10 INJECTION INTRAVENOUS at 08:33

## 2019-03-15 RX ADMIN — EPHEDRINE SULFATE 10 MG: 50 INJECTION INTRAMUSCULAR; INTRAVENOUS; SUBCUTANEOUS at 08:39

## 2019-03-15 RX ADMIN — EPHEDRINE SULFATE 10 MG: 50 INJECTION INTRAMUSCULAR; INTRAVENOUS; SUBCUTANEOUS at 09:01

## 2019-03-15 RX ADMIN — CEFOXITIN SODIUM 2 G: 1 POWDER, FOR SOLUTION INTRAVENOUS at 08:23

## 2019-03-15 RX ADMIN — FAMOTIDINE 20 MG: 20 TABLET ORAL at 08:05

## 2019-03-15 RX ADMIN — FENTANYL CITRATE 25 MCG: 50 INJECTION, SOLUTION INTRAMUSCULAR; INTRAVENOUS at 09:15

## 2019-03-15 RX ADMIN — IPRATROPIUM BROMIDE AND ALBUTEROL SULFATE 3 ML: 2.5; .5 SOLUTION RESPIRATORY (INHALATION) at 08:08

## 2019-03-15 RX ADMIN — ROCURONIUM BROMIDE 35 MG: 10 INJECTION INTRAVENOUS at 08:26

## 2019-03-15 RX ADMIN — ROCURONIUM BROMIDE 10 MG: 10 INJECTION INTRAVENOUS at 09:22

## 2019-03-15 RX ADMIN — SODIUM CHLORIDE, POTASSIUM CHLORIDE, SODIUM LACTATE AND CALCIUM CHLORIDE: 600; 310; 30; 20 INJECTION, SOLUTION INTRAVENOUS at 08:23

## 2019-03-15 RX ADMIN — FENTANYL CITRATE 50 MCG: 50 INJECTION INTRAMUSCULAR; INTRAVENOUS at 10:11

## 2019-03-15 RX ADMIN — ONDANSETRON 4 MG: 2 INJECTION INTRAMUSCULAR; INTRAVENOUS at 09:35

## 2019-03-15 RX ADMIN — NEOSTIGMINE METHYLSULFATE 3 MG: 1 INJECTION, SOLUTION INTRAVENOUS at 09:37

## 2019-03-15 RX ADMIN — GLYCOPYRROLATE 0.4 MG: 0.2 INJECTION, SOLUTION INTRAMUSCULAR; INTRAVENOUS at 09:37

## 2019-03-15 RX ADMIN — PHENYLEPHRINE HYDROCHLORIDE 100 MCG: 10 INJECTION INTRAVENOUS at 08:39

## 2019-03-15 RX ADMIN — EPHEDRINE SULFATE 10 MG: 50 INJECTION INTRAMUSCULAR; INTRAVENOUS; SUBCUTANEOUS at 08:35

## 2019-03-15 NOTE — ANESTHESIA PROCEDURE NOTES
Airway  Urgency: elective    Airway not difficult    General Information and Staff    Patient location during procedure: OR  CRNA: Nathaniel Trent CRNA    Indications and Patient Condition  Indications for airway management: airway protection    Preoxygenated: yes  MILS not maintained throughout  Mask difficulty assessment: 1 - vent by mask    Final Airway Details  Final airway type: endotracheal airway      Successful airway: ETT  Cuffed: yes   Successful intubation technique: direct laryngoscopy  Endotracheal tube insertion site: oral  Blade: Najma  Blade size: 3  ETT size (mm): 7.0  Cormack-Lehane Classification: grade I - full view of glottis  Placement verified by: chest auscultation and capnometry   Measured from: lips  ETT to lips (cm): 20  Number of attempts at approach: 1    Additional Comments  Negative epigastric sounds, Breath sound equal bilaterally with symmetric chest rise and fall

## 2019-03-15 NOTE — INTERVAL H&P NOTE
"Pre-Op H&P (See Recent Office Note Attached for Full H&P)    Chief complaint: Hematuria    Review of Systems:  General ROS:  no fever, chills, rashes, No change since last office visit  Cardiovascular ROS: no chest pain or dyspnea on exertion  Respiratory ROS: + cough, shortness of breath, or wheezing.  Recent COPD exacerbation with afib/rvr requiring [facility] hospitalization, discharge on 3/13/19  Neuro:  Last seizure 2018    Meds:    No current facility-administered medications on file prior to encounter.      Current Outpatient Medications on File Prior to Encounter   Medication Sig Dispense Refill   • acetaminophen (TYLENOL) 500 MG tablet Take 500 mg by mouth Every 6 (Six) Hours As Needed for Mild Pain .     • aspirin 81 MG EC tablet Take 162 mg by mouth Daily.     • diltiaZEM CD (CARDIZEM CD) 180 MG 24 hr capsule Take 1 capsule by mouth Daily. 30 capsule 1   • lisinopril (PRINIVIL,ZESTRIL) 2.5 MG tablet Take 1 tablet by mouth Daily. 30 tablet 1   • metoprolol tartrate (LOPRESSOR) 25 MG tablet Take 0.5 tablets by mouth Every 12 (Twelve) Hours. 60 tablet 1   • oxyCODONE-acetaminophen (PERCOCET) 5-325 MG per tablet Take 1 tablet by mouth Every 4 (Four) Hours As Needed for Moderate Pain  for up to 9 days. 15 tablet 0       Vital Signs:  /71 (BP Location: Right arm, Patient Position: Lying)   Pulse 58   Temp 98.2 °F (36.8 °C) (Temporal)   Resp 16   Ht 167.6 cm (66\")   Wt 53.1 kg (117 lb)   SpO2 93%   BMI 18.88 kg/m²     Physical Exam:    CV:  S1S2 regular rate and rhythm, no murmur               Resp:  Coarse with scattered rhonchi and a few scattered exp wheeze to auscultation (Immediately s/p neb); respirations regular, even and unlabored   Neuro:  LLE weakness.  Left  absent    Results Review:    I reviewed the patient's new clinical results.    Cancer Staging (if applicable)  Cancer Patient: __ yes __no _x_unknown; If yes, clinical stage T:__ N:__M:__, stage group or " __N/A    Assessment/Plan:    Bladder tumor - Cystoscopy, Transurethral resection of bladder tumor    Ximena Foster, APRN  3/15/2019   8:14 AM

## 2019-03-15 NOTE — ANESTHESIA POSTPROCEDURE EVALUATION
"Patient: Luther Martines    Procedure Summary     Date:  03/15/19 Room / Location:   [facility] 07 /  [facility]    Anesthesia Start:  0823 Anesthesia Stop:      Procedure:  CYSTOSCOPY TRANSURETHRAL RESECTION OF BLADDER TUMOR (N/A Abdomen) Diagnosis:      Surgeon:  Anam Poe MD Provider:  Arash Whiteside MD    Anesthesia Type:  general ASA Status:  3          Anesthesia Type: general  Last vitals  BP   141/79   Temp   98   Pulse   72   Resp   16     SpO2   100     Post Anesthesia Care and Evaluation    Patient location during evaluation: PACU  Patient participation: complete - patient participated  Level of consciousness: awake and responsive to verbal stimuli  Pain score: 2  Pain management: adequate  Airway patency: patent  Anesthetic complications: No anesthetic complications    Cardiovascular status: acceptable  Respiratory status: acceptable  Hydration status: acceptable    Comments: Pt awake and responsive. SV. VSS. Report to RN. Patient Vitals in the past 24 hrs:  03/15/19 0808, Pulse:58, Resp:16, SpO2:93 %  03/15/19 0758, BP:113/71, Temp:98.2 °F (36.8 °C), Temp src:Temporal, Pulse:63, Resp:18, SpO2:93 %, Height:167.6 cm (66\"), Weight:53.1 kg (117 lb)  133/78. p 72. r 16. t 98.1                  "

## 2019-03-15 NOTE — ED PROVIDER NOTES
Subjective   Luther Martines is a 63 y.o.male who presents to the emergency department with complaints of post-operative penile pain after a cystoscopy and transurethral resection of a bladder tumor with Dr. Poe this morning. The patient was placed in a urinary catheter after the procedure and was given a prescription for Lortab although he says that he is unable to take it because it gives him migraines. He can take Percocet. The patient's wife has called Dr. Poe' office three times today to ask about changing his medication but has not received a call back. She then brought the patient here for evaluation and pain control. He denies fever, nausea, or vomiting. There are no other acute complaints at this time.        History provided by:  Patient and spouse  Pain   Location:  Penis  Quality:  Post-operative pain  Severity:  Severe  Onset quality:  Sudden  Duration:  1 day  Timing:  Constant  Progression:  Unchanged  Chronicity:  New  Relieved by:  None tried  Worsened by:  Nothing  Ineffective treatments:  None tried  Associated symptoms: no fever, no nausea and no vomiting        Review of Systems   Constitutional: Negative for fever.   Gastrointestinal: Negative for nausea and vomiting.   Genitourinary: Positive for penile pain.   All other systems reviewed and are negative.      Past Medical History:   Diagnosis Date   • A-fib (CMS/HCC)    • COPD (chronic obstructive pulmonary disease) (CMS/HCC)    • Hypertension    • Macular degeneration    • On home oxygen therapy    • Seizures (CMS/HCC)        Allergies   Allergen Reactions   • Lortab [Hydrocodone-Acetaminophen] Unknown (See Comments)     migraines   • Morphine Anxiety       Past Surgical History:   Procedure Laterality Date   • BRAIN SURGERY     • EYE SURGERY      cataract       Family History   Problem Relation Age of Onset   • COPD Mother    • Diabetes Father    • Hypertension Father    • Macular degeneration Father        Social History      Socioeconomic History   • Marital status:      Spouse name: Not on file   • Number of children: Not on file   • Years of education: Not on file   • Highest education level: Not on file   Tobacco Use   • Smoking status: Heavy Tobacco Smoker     Packs/day: 2.00   • Smokeless tobacco: Never Used   Substance and Sexual Activity   • Alcohol use: No     Frequency: Never   • Drug use: No   • Sexual activity: Defer         Objective   Physical Exam   Constitutional: He is oriented to person, place, and time. He appears well-developed and well-nourished. No distress.   HENT:   Head: Normocephalic and atraumatic.   Mouth/Throat: Oropharynx is clear and moist.   Eyes: Conjunctivae are normal. No scleral icterus.   Neck: Normal range of motion. Neck supple.   Cardiovascular: Normal rate, regular rhythm and normal heart sounds.   No murmur heard.  Pulmonary/Chest: Effort normal and breath sounds normal. No respiratory distress.   Abdominal: Soft. Bowel sounds are normal. There is no tenderness. There is no rigidity, no rebound and no guarding.   Genitourinary:   Genitourinary Comments: Urinary catheter in place draining clear urine.   Musculoskeletal: He exhibits no edema.   Neurological: He is alert and oriented to person, place, and time.   Skin: Skin is warm and dry. No rash noted. No erythema.   Psychiatric: He has a normal mood and affect. His behavior is normal.   Nursing note and vitals reviewed.      Procedures         ED Course  ED Course as of Mar 15 1912   Fri Mar 15, 2019   1431 Stephanie Poole spoke with Dr. Poe' office and they have a prescription for percocet ready. He just needs to pick it up.  [AS]      ED Course User Index  [AS] Tiffany Montelongo     Recent Results (from the past 24 hour(s))   OR Potassium    Collection Time: 03/15/19  7:40 AM   Result Value Ref Range    Potassium, OR 4.50 3.5 - 5.3 mmol/L   CBC (No Diff)    Collection Time: 03/15/19  7:40 AM   Result Value Ref Range    WBC 19.33 (H)  "3.50 - 10.80 10*3/mm3    RBC 5.41 4.20 - 5.76 10*6/mm3    Hemoglobin 15.0 13.1 - 17.5 g/dL    Hematocrit 47.1 38.9 - 50.9 %    MCV 87.1 80.0 - 99.0 fL    MCH 27.7 27.0 - 31.0 pg    MCHC 31.8 (L) 32.0 - 36.0 g/dL    RDW 15.1 (H) 11.3 - 14.5 %    RDW-SD 47.6 37.0 - 54.0 fl    MPV 9.4 6.0 - 12.0 fL    Platelets 367 150 - 450 10*3/mm3     Note: In addition to lab results from this visit, the labs listed above may include labs taken at another facility or during a different encounter within the last 24 hours. Please correlate lab times with ED admission and discharge times for further clarification of the services performed during this visit.    No orders to display     Vitals:    03/15/19 1342   BP: 138/64   BP Location: Right arm   Patient Position: Sitting   Pulse: 85   Resp: 18   Temp: 97.4 °F (36.3 °C)   TempSrc: Oral   SpO2: 95%   Weight: 53.1 kg (117 lb)   Height: 167.6 cm (66\")     Medications   HYDROmorphone (DILAUDID) injection 1 mg (1 mg Intramuscular Given 3/15/19 1442)     ECG/EMG Results (last 24 hours)     ** No results found for the last 24 hours. **        No orders to display     After medication patient states that his pain is nearly gone     Patient and wife instructed to go to Dr. Harris office upon discharge to  the Percocet.  Patient and wife verbalize that they are happy and thankful for the care received.  Patient left facility ambulatory in no acute distress.              MDM    Final diagnoses:   Post-op pain       Documentation assistance provided by balwinder Montelongo.  Information recorded by the balwinder was done at my direction and has been verified and validated by me.     Tiffany Montelongo  03/15/19 3451       Tiffany Montelongo  03/15/19 0822       Stephanie Poole APRN  03/16/19 1396    "

## 2019-03-15 NOTE — OP NOTE
CYSTOSCOPY TRANSURETHRAL RESECTION OF BLADDER TUMOR  Procedure Report    Patient Name:  Luther Martines  YOB: 1955    Date of Surgery:  3/15/2019     Indications:  Bladder mass, gross hematuria    Pre-op Diagnosis:   Bladder mass       Post-Op Diagnosis Codes:   same with urethral stricture    Procedure/CPT® Codes:      Procedure(s):  CYSTOSCOPY TRANSURETHRAL RESECTION OF BLADDER TUMOR    Staff:  Surgeon(s):  Anam Poe MD         Anesthesia: General    Estimated Blood Loss: less than 50ml    Implants:    Nothing was implanted during the procedure    Specimen:          ID Type Source Tests Collected by Time   A :  Tissue Urinary Bladder TISSUE PATHOLOGY EXAM Anam Poe MD 3/15/2019 0946         Findings: large pedunculated left lateral bladder mass, proximal pendulous urethral stricture    Complications: none    Description of Procedure: General anesthesia placed in lithotomy. Prepped and draped. 21 fr cysto sheath passed.  Urethra was snug as above approx 2cm area. Prostate is non obstructive. Bladder inspected with 30 and 70 degree lens. No other tumor.  Tumor 5cm diameter and lateral to the left U.O.   I sounded urethra but very tight as above but good only get to 26 fr. 24fr resection sheath placed and loop resection down to nice stalk. Deep margins taken. Base and margins cauterized. There was a large tuft of vessels in the dome which also was cauterized.  All specimen was collected and catheter placed.         Anam Poe MD     Date: 3/15/2019  Time: 9:46 AM

## 2019-03-18 LAB
CYTO UR: NORMAL
LAB AP CASE REPORT: NORMAL
LAB AP CLINICAL INFORMATION: NORMAL
LAB AP DIAGNOSIS COMMENT: NORMAL
PATH REPORT.FINAL DX SPEC: NORMAL
PATH REPORT.GROSS SPEC: NORMAL

## 2019-03-19 ENCOUNTER — APPOINTMENT (OUTPATIENT)
Dept: CT IMAGING | Facility: HOSPITAL | Age: 64
End: 2019-03-19

## 2019-03-19 ENCOUNTER — APPOINTMENT (OUTPATIENT)
Dept: GENERAL RADIOLOGY | Facility: HOSPITAL | Age: 64
End: 2019-03-19

## 2019-03-19 ENCOUNTER — HOSPITAL ENCOUNTER (EMERGENCY)
Facility: HOSPITAL | Age: 64
Discharge: HOME | End: 2019-03-19
Attending: EMERGENCY MEDICINE | Admitting: EMERGENCY MEDICINE

## 2019-03-19 VITALS
HEART RATE: 81 BPM | DIASTOLIC BLOOD PRESSURE: 54 MMHG | TEMPERATURE: 98 F | BODY MASS INDEX: 18.8 KG/M2 | WEIGHT: 117 LBS | OXYGEN SATURATION: 95 % | HEIGHT: 66 IN | SYSTOLIC BLOOD PRESSURE: 117 MMHG | RESPIRATION RATE: 18 BRPM

## 2019-03-19 DIAGNOSIS — R10.84 GENERALIZED ABDOMINAL PAIN: ICD-10-CM

## 2019-03-19 DIAGNOSIS — T83.511A URINARY TRACT INFECTION ASSOCIATED WITH INDWELLING URETHRAL CATHETER, INITIAL ENCOUNTER (HCC): ICD-10-CM

## 2019-03-19 DIAGNOSIS — N39.0 URINARY TRACT INFECTION ASSOCIATED WITH INDWELLING URETHRAL CATHETER, INITIAL ENCOUNTER (HCC): ICD-10-CM

## 2019-03-19 DIAGNOSIS — R11.2 NON-INTRACTABLE VOMITING WITH NAUSEA, UNSPECIFIED VOMITING TYPE: ICD-10-CM

## 2019-03-19 DIAGNOSIS — D72.829 LEUKOCYTOSIS, UNSPECIFIED TYPE: Primary | ICD-10-CM

## 2019-03-19 LAB
ALBUMIN SERPL-MCNC: 4.6 G/DL (ref 3.2–4.8)
ALBUMIN/GLOB SERPL: 1.7 G/DL (ref 1.5–2.5)
ALP SERPL-CCNC: 101 U/L (ref 25–100)
ALT SERPL W P-5'-P-CCNC: 34 U/L (ref 7–40)
ANION GAP SERPL CALCULATED.3IONS-SCNC: 11 MMOL/L (ref 3–11)
AST SERPL-CCNC: 30 U/L (ref 0–33)
BACTERIA UR QL AUTO: ABNORMAL /HPF
BASOPHILS # BLD AUTO: 0.04 10*3/MM3 (ref 0–0.2)
BASOPHILS NFR BLD AUTO: 0.2 % (ref 0–1)
BILIRUB SERPL-MCNC: 0.4 MG/DL (ref 0.3–1.2)
BILIRUB UR QL STRIP: NEGATIVE
BNP SERPL-MCNC: 24 PG/ML (ref 0–100)
BUN BLD-MCNC: 16 MG/DL (ref 9–23)
BUN/CREAT SERPL: 20.5 (ref 7–25)
CALCIUM SPEC-SCNC: 9.8 MG/DL (ref 8.7–10.4)
CHLORIDE SERPL-SCNC: 100 MMOL/L (ref 99–109)
CLARITY UR: CLEAR
CO2 SERPL-SCNC: 22 MMOL/L (ref 20–31)
COLOR UR: YELLOW
CREAT BLD-MCNC: 0.78 MG/DL (ref 0.6–1.3)
DEPRECATED RDW RBC AUTO: 46.1 FL (ref 37–54)
EOSINOPHIL # BLD AUTO: 0.1 10*3/MM3 (ref 0–0.3)
EOSINOPHIL NFR BLD AUTO: 0.5 % (ref 0–3)
ERYTHROCYTE [DISTWIDTH] IN BLOOD BY AUTOMATED COUNT: 14.8 % (ref 11.3–14.5)
GFR SERPL CREATININE-BSD FRML MDRD: 101 ML/MIN/1.73
GLOBULIN UR ELPH-MCNC: 2.7 GM/DL
GLUCOSE BLD-MCNC: 110 MG/DL (ref 70–100)
GLUCOSE UR STRIP-MCNC: NEGATIVE MG/DL
HCT VFR BLD AUTO: 41.5 % (ref 38.9–50.9)
HGB BLD-MCNC: 13.5 G/DL (ref 13.1–17.5)
HGB UR QL STRIP.AUTO: ABNORMAL
HOLD SPECIMEN: NORMAL
HOLD SPECIMEN: NORMAL
HYALINE CASTS UR QL AUTO: ABNORMAL /LPF
IMM GRANULOCYTES # BLD AUTO: 0.1 10*3/MM3 (ref 0–0.05)
IMM GRANULOCYTES NFR BLD AUTO: 0.5 % (ref 0–0.6)
KETONES UR QL STRIP: NEGATIVE
LEUKOCYTE ESTERASE UR QL STRIP.AUTO: ABNORMAL
LIPASE SERPL-CCNC: 38 U/L (ref 6–51)
LYMPHOCYTES # BLD AUTO: 1.68 10*3/MM3 (ref 0.6–4.8)
LYMPHOCYTES NFR BLD AUTO: 8.6 % (ref 24–44)
MCH RBC QN AUTO: 27.4 PG (ref 27–31)
MCHC RBC AUTO-ENTMCNC: 32.5 G/DL (ref 32–36)
MCV RBC AUTO: 84.3 FL (ref 80–99)
MONOCYTES # BLD AUTO: 1.57 10*3/MM3 (ref 0–1)
MONOCYTES NFR BLD AUTO: 8.1 % (ref 0–12)
NEUTROPHILS # BLD AUTO: 15.96 10*3/MM3 (ref 1.5–8.3)
NEUTROPHILS NFR BLD AUTO: 82.1 % (ref 41–71)
NITRITE UR QL STRIP: NEGATIVE
PH UR STRIP.AUTO: 6 [PH] (ref 5–8)
PLATELET # BLD AUTO: 433 10*3/MM3 (ref 150–450)
PMV BLD AUTO: 9.5 FL (ref 6–12)
POTASSIUM BLD-SCNC: 4.7 MMOL/L (ref 3.5–5.5)
PROT SERPL-MCNC: 7.3 G/DL (ref 5.7–8.2)
PROT UR QL STRIP: ABNORMAL
RBC # BLD AUTO: 4.92 10*6/MM3 (ref 4.2–5.76)
RBC # UR: ABNORMAL /HPF
REF LAB TEST METHOD: ABNORMAL
SODIUM BLD-SCNC: 133 MMOL/L (ref 132–146)
SP GR UR STRIP: 1.06 (ref 1–1.03)
SQUAMOUS #/AREA URNS HPF: ABNORMAL /HPF
TROPONIN I SERPL-MCNC: <0.006 NG/ML
UROBILINOGEN UR QL STRIP: ABNORMAL
WBC NRBC COR # BLD: 19.45 10*3/MM3 (ref 3.5–10.8)
WBC UR QL AUTO: ABNORMAL /HPF
WHOLE BLOOD HOLD SPECIMEN: NORMAL
WHOLE BLOOD HOLD SPECIMEN: NORMAL

## 2019-03-19 PROCEDURE — 25010000002 HYDROMORPHONE 1 MG/ML SOLUTION: Performed by: EMERGENCY MEDICINE

## 2019-03-19 PROCEDURE — 71275 CT ANGIOGRAPHY CHEST: CPT

## 2019-03-19 PROCEDURE — 83880 ASSAY OF NATRIURETIC PEPTIDE: CPT | Performed by: EMERGENCY MEDICINE

## 2019-03-19 PROCEDURE — 80053 COMPREHEN METABOLIC PANEL: CPT | Performed by: EMERGENCY MEDICINE

## 2019-03-19 PROCEDURE — 87040 BLOOD CULTURE FOR BACTERIA: CPT | Performed by: EMERGENCY MEDICINE

## 2019-03-19 PROCEDURE — 93005 ELECTROCARDIOGRAM TRACING: CPT

## 2019-03-19 PROCEDURE — 96375 TX/PRO/DX INJ NEW DRUG ADDON: CPT

## 2019-03-19 PROCEDURE — 25010000002 ONDANSETRON PER 1 MG: Performed by: NURSE PRACTITIONER

## 2019-03-19 PROCEDURE — 0 IOPAMIDOL PER 1 ML: Performed by: EMERGENCY MEDICINE

## 2019-03-19 PROCEDURE — 96365 THER/PROPH/DIAG IV INF INIT: CPT

## 2019-03-19 PROCEDURE — 74177 CT ABD & PELVIS W/CONTRAST: CPT

## 2019-03-19 PROCEDURE — 83690 ASSAY OF LIPASE: CPT | Performed by: EMERGENCY MEDICINE

## 2019-03-19 PROCEDURE — 81001 URINALYSIS AUTO W/SCOPE: CPT | Performed by: EMERGENCY MEDICINE

## 2019-03-19 PROCEDURE — 99284 EMERGENCY DEPT VISIT MOD MDM: CPT

## 2019-03-19 PROCEDURE — 71045 X-RAY EXAM CHEST 1 VIEW: CPT

## 2019-03-19 PROCEDURE — 85025 COMPLETE CBC W/AUTO DIFF WBC: CPT | Performed by: EMERGENCY MEDICINE

## 2019-03-19 PROCEDURE — 25010000002 CEFTRIAXONE PER 250 MG: Performed by: EMERGENCY MEDICINE

## 2019-03-19 PROCEDURE — 84484 ASSAY OF TROPONIN QUANT: CPT | Performed by: EMERGENCY MEDICINE

## 2019-03-19 PROCEDURE — 93005 ELECTROCARDIOGRAM TRACING: CPT | Performed by: EMERGENCY MEDICINE

## 2019-03-19 RX ORDER — CEFTRIAXONE SODIUM 1 G/50ML
1 INJECTION, SOLUTION INTRAVENOUS ONCE
Status: COMPLETED | OUTPATIENT
Start: 2019-03-19 | End: 2019-03-19

## 2019-03-19 RX ORDER — ONDANSETRON 4 MG/1
4 TABLET, FILM COATED ORAL EVERY 6 HOURS PRN
Qty: 8 TABLET | Refills: 0 | Status: SHIPPED | OUTPATIENT
Start: 2019-03-19 | End: 2019-07-11

## 2019-03-19 RX ORDER — OXYCODONE AND ACETAMINOPHEN 7.5; 325 MG/1; MG/1
1 TABLET ORAL EVERY 6 HOURS PRN
Status: ON HOLD | COMMUNITY
End: 2019-03-21 | Stop reason: SDUPTHER

## 2019-03-19 RX ORDER — ONDANSETRON 2 MG/ML
4 INJECTION INTRAMUSCULAR; INTRAVENOUS ONCE
Status: COMPLETED | OUTPATIENT
Start: 2019-03-19 | End: 2019-03-19

## 2019-03-19 RX ORDER — SODIUM CHLORIDE 0.9 % (FLUSH) 0.9 %
10 SYRINGE (ML) INJECTION AS NEEDED
Status: DISCONTINUED | OUTPATIENT
Start: 2019-03-19 | End: 2019-03-19 | Stop reason: HOSPADM

## 2019-03-19 RX ORDER — CEFDINIR 300 MG/1
300 CAPSULE ORAL 2 TIMES DAILY
Qty: 14 CAPSULE | Refills: 0 | Status: SHIPPED | OUTPATIENT
Start: 2019-03-19 | End: 2019-03-21 | Stop reason: HOSPADM

## 2019-03-19 RX ADMIN — HYDROMORPHONE HYDROCHLORIDE 1 MG: 1 INJECTION, SOLUTION INTRAMUSCULAR; INTRAVENOUS; SUBCUTANEOUS at 05:00

## 2019-03-19 RX ADMIN — CEFTRIAXONE SODIUM 1 G: 1 INJECTION, SOLUTION INTRAVENOUS at 09:02

## 2019-03-19 RX ADMIN — ONDANSETRON 4 MG: 2 INJECTION INTRAMUSCULAR; INTRAVENOUS at 04:59

## 2019-03-19 RX ADMIN — IOPAMIDOL 75 ML: 755 INJECTION, SOLUTION INTRAVENOUS at 05:17

## 2019-03-19 RX ADMIN — SODIUM CHLORIDE 500 ML: 9 INJECTION, SOLUTION INTRAVENOUS at 08:47

## 2019-03-19 NOTE — ED PROVIDER NOTES
Subjective   Mr. Luther Martines is a 63 y.o. male who presents to the ED with complaints of generalized weakness onset today. Wife reports patient was recently admitted to [facility] for A-fib, COPD exacerbation, and newly diagnosed bladder cancer. During admission, cystoscopy and  transurethral resection of a bladder tumor was performed by Dr. Poe and following the procedure patient complained of post-operative penile pain.  The patient was placed in a urinary catheter after the procedure and was given a prescription for Lortab, however wife states this did not improve the pain and he reported to this ED for pain relief. Since recent ED visit, patient has developed progressively worsening generalized weakness, back pain and shortness of breath with accompanied chest pain, wheezing, and productive cough with white/yellow sputum. Wife notes patient will have episodes of fever for approximately 30 minutes and it will self- resolve.  Patient denies nausea, vomiting, diarrhea, chills, and any other acute symptoms at this time. Past medical history also significant for seizures, hypertension, O2 therapy, macular degeneration.     Urinary cath is still in place.         History provided by:  Patient and relative  Weakness - Generalized   Severity:  Moderate  Onset quality:  Sudden  Duration:  2 days  Timing:  Constant  Progression:  Worsening  Context comment:  S/p bladder tumor resection  Relieved by:  Nothing  Worsened by:  Nothing  Ineffective treatments: Lortab.  Associated symptoms: chest pain, cough, fever (intermittent episodes ) and shortness of breath    Associated symptoms: no diarrhea, no nausea and no vomiting        Review of Systems   Constitutional: Positive for fever (intermittent episodes ). Negative for chills.   Respiratory: Positive for cough, shortness of breath and wheezing.    Cardiovascular: Positive for chest pain.   Gastrointestinal: Negative for diarrhea, nausea and vomiting.   Musculoskeletal:  Positive for back pain.   All other systems reviewed and are negative.      Past Medical History:   Diagnosis Date   • A-fib (CMS/HCC)    • COPD (chronic obstructive pulmonary disease) (CMS/HCC)    • Hypertension    • Macular degeneration    • On home oxygen therapy    • Seizures (CMS/HCC)        Allergies   Allergen Reactions   • Lortab [Hydrocodone-Acetaminophen] Unknown (See Comments)     migraines   • Morphine Anxiety       Past Surgical History:   Procedure Laterality Date   • BRAIN SURGERY     • EYE SURGERY      cataract   • TRANSURETHRAL RESECTION OF BLADDER TUMOR N/A 3/15/2019    Procedure: CYSTOSCOPY TRANSURETHRAL RESECTION OF BLADDER TUMOR;  Surgeon: Anam Poe MD;  Location: [facility];  Service: Urology       Family History   Problem Relation Age of Onset   • COPD Mother    • Diabetes Father    • Hypertension Father    • Macular degeneration Father        Social History     Socioeconomic History   • Marital status:      Spouse name: Not on file   • Number of children: Not on file   • Years of education: Not on file   • Highest education level: Not on file   Tobacco Use   • Smoking status: Heavy Tobacco Smoker     Packs/day: 2.00   • Smokeless tobacco: Never Used   Substance and Sexual Activity   • Alcohol use: No     Frequency: Never   • Drug use: No   • Sexual activity: Defer         Objective   Physical Exam   Constitutional: He is oriented to person, place, and time. He appears well-developed and well-nourished. No distress.   HENT:   Head: Normocephalic and atraumatic.   Right Ear: External ear normal.   Left Ear: External ear normal.   Nose: Nose normal.   Eyes: Conjunctivae are normal. No scleral icterus.   Neck: Normal range of motion.   Cardiovascular: Normal rate, regular rhythm and normal heart sounds. Exam reveals no gallop and no friction rub.   No murmur heard.  Pulmonary/Chest: Effort normal and breath sounds normal. No respiratory distress. He has no wheezes. He has no  rales. He exhibits no tenderness.   Abdominal: Soft. Bowel sounds are normal.   Musculoskeletal: Normal range of motion.   Neurological: He is alert and oriented to person, place, and time.   Skin: Skin is warm and dry. He is not diaphoretic.   Psychiatric: He has a normal mood and affect. His behavior is normal.   Nursing note and vitals reviewed.      Procedures         ED Course  ED Course as of Mar 25 1247   Tue Mar 19, 2019   0722 I discussed the case with Dr. Lowe, on-call for Dr. Poe, urology.  He states that as the catheter was scheduled to come out later today we can go and remove the catheter.  We will perform a urinalysis following the removal of the catheter and if a urinary tract infection is present administer antibiotics.  Patient ready has an appointment with Dr. Garcia, urology, at 2 PM today.  Dr. Lowe recommends that he keep that appointment and due to the patient's leukocytosis he can be reevaluated at that time and if further management is needed it can be performed or initiated at that time.    Although he appears nontoxic at this time and prefers going home and this was strongly recommend otherwise, he does have a significant leukocytosis and I have stressed with the patient the importance of having an extremely low threshold to return to the emergency department should his symptoms worsen or other concerns arise.  [CP]   0858 Since I assumed care from Marilyn, patient's abdominal pain and nausea has completely resolved.  He states that is been resolved for an extended period prior to my examination also.  I reviewed the laboratory and imaging studies with the family.  As noted above, I do have my concerns regarding his significant leukocytosis and I have stressed multiple times the importance of returning should he become symptomatic.  I discussed these results also including leukocytosis, with Dr. Lowe, urology.  As the patient has a follow-up artery scheduled later today we both  "believe it is reasonable for the patient to be discharged on antibiotic to treat possible UTI and have a low threshold to return.  This outpatient treatment is also the patient's desire.  [CP]      ED Course User Index  [CP] Omer Zacarias, DO      No results found for this or any previous visit (from the past 24 hour(s)).  Note: In addition to lab results from this visit, the labs listed above may include labs taken at another facility or during a different encounter within the last 24 hours. Please correlate lab times with ED admission and discharge times for further clarification of the services performed during this visit.    CT Abdomen Pelvis With Contrast   Final Result   No acute findings.    COPD with left-sided pleural plaque formation.   Borderline prostatomegaly. Macedo catheter within the bladder. Moderate left    sided bladder wall thickening. Further investigation may be warranted.   Atheromatous vascular calcification.      THIS DOCUMENT HAS BEEN ELECTRONICALLY SIGNED BY HENRY SHORT MD      CT Angiogram Chest With Contrast   Final Result   No acute findings. No evidence of pulmonary embolus or aortic dissection.    COPD with scattered fibrotic/emphysematous changes, left greater than right.   Left-sided pleural plaques.   Mild atheromatous aortic changes.      THIS DOCUMENT HAS BEEN ELECTRONICALLY SIGNED BY HENRY SHORT MD      XR Chest 1 View   Final Result   COPD with left-sided pleural plaques/calcifications and left apical pleural    thickening.      THIS DOCUMENT HAS BEEN ELECTRONICALLY SIGNED BY HENRY SHORT MD        Vitals:    03/19/19 0211   BP: 125/76   BP Location: Right arm   Patient Position: Sitting   Pulse: 109   Resp: 18   Temp: 98.7 °F (37.1 °C)   TempSrc: Oral   SpO2: 92%   Weight: 53.1 kg (117 lb)   Height: 167.6 cm (66\")     Medications   sodium chloride 0.9 % flush 10 mL (not administered)   HYDROmorphone (DILAUDID) injection 1 mg (not administered)   ondansetron (ZOFRAN) injection 4 " mg (not administered)     ECG/EMG Results (last 24 hours)     Procedure Component Value Units Date/Time    ECG 12 Lead [[id number]] Collected:  03/19/19 0228     Updated:  03/19/19 0228        ECG 12 Lead                             MDM    Final diagnoses:   Leukocytosis, unspecified type   Urinary tract infection associated with indwelling urethral catheter, initial encounter (CMS/[facility])   Generalized abdominal pain   Non-intractable vomiting with nausea, unspecified vomiting type       Documentation assistance provided by balwinder Lea.  Information recorded by the scribe was done at my direction and has been verified and validated by me.     Percy Lea  03/19/19 0418       Omer Zacarias DO  03/25/19 1250

## 2019-03-20 ENCOUNTER — HOSPITAL ENCOUNTER (OUTPATIENT)
Facility: HOSPITAL | Age: 64
Setting detail: OBSERVATION
Discharge: HOME | End: 2019-03-21
Attending: EMERGENCY MEDICINE | Admitting: FAMILY MEDICINE

## 2019-03-20 ENCOUNTER — READMISSION MANAGEMENT (OUTPATIENT)
Dept: CALL CENTER | Facility: HOSPITAL | Age: 64
End: 2019-03-20

## 2019-03-20 DIAGNOSIS — Z74.09 IMPAIRED FUNCTIONAL MOBILITY, BALANCE, GAIT, AND ENDURANCE: ICD-10-CM

## 2019-03-20 DIAGNOSIS — R53.1 WEAKNESS: ICD-10-CM

## 2019-03-20 DIAGNOSIS — R62.7 ADULT FAILURE TO THRIVE SYNDROME: ICD-10-CM

## 2019-03-20 DIAGNOSIS — R19.7 DIARRHEA IN ADULT PATIENT: ICD-10-CM

## 2019-03-20 DIAGNOSIS — E86.0 DEHYDRATION, MODERATE: ICD-10-CM

## 2019-03-20 DIAGNOSIS — N30.01 ACUTE CYSTITIS WITH HEMATURIA: Primary | ICD-10-CM

## 2019-03-20 PROBLEM — R11.2 NAUSEA, VOMITING, AND DIARRHEA: Status: ACTIVE | Noted: 2019-03-20

## 2019-03-20 PROBLEM — J44.9 COPD (CHRONIC OBSTRUCTIVE PULMONARY DISEASE) (HCC): Status: ACTIVE | Noted: 2019-03-20

## 2019-03-20 PROBLEM — D72.829 LEUKOCYTOSIS: Status: ACTIVE | Noted: 2019-03-20

## 2019-03-20 PROBLEM — N39.0 ACUTE UTI (URINARY TRACT INFECTION): Status: ACTIVE | Noted: 2019-03-20

## 2019-03-20 LAB
ALBUMIN SERPL-MCNC: 4.57 G/DL (ref 3.2–4.8)
ALBUMIN/GLOB SERPL: 1.8 G/DL (ref 1.5–2.5)
ALP SERPL-CCNC: 97 U/L (ref 25–100)
ALT SERPL W P-5'-P-CCNC: 51 U/L (ref 7–40)
AMYLASE SERPL-CCNC: 83 U/L (ref 30–118)
ANION GAP SERPL CALCULATED.3IONS-SCNC: 9 MMOL/L (ref 3–11)
AST SERPL-CCNC: 37 U/L (ref 0–33)
BACTERIA UR QL AUTO: ABNORMAL /HPF
BASOPHILS # BLD AUTO: 0.04 10*3/MM3 (ref 0–0.2)
BASOPHILS NFR BLD AUTO: 0.3 % (ref 0–1)
BILIRUB SERPL-MCNC: 0.4 MG/DL (ref 0.3–1.2)
BILIRUB UR QL STRIP: NEGATIVE
BUN BLD-MCNC: 16 MG/DL (ref 9–23)
BUN/CREAT SERPL: 20.3 (ref 7–25)
CALCIUM SPEC-SCNC: 9.6 MG/DL (ref 8.7–10.4)
CHLORIDE SERPL-SCNC: 104 MMOL/L (ref 99–109)
CLARITY UR: CLEAR
CO2 SERPL-SCNC: 24 MMOL/L (ref 20–31)
COLOR UR: YELLOW
CREAT BLD-MCNC: 0.79 MG/DL (ref 0.6–1.3)
D-LACTATE SERPL-SCNC: 0.8 MMOL/L (ref 0.5–2)
DEPRECATED RDW RBC AUTO: 48 FL (ref 37–54)
EOSINOPHIL # BLD AUTO: 0.08 10*3/MM3 (ref 0–0.3)
EOSINOPHIL NFR BLD AUTO: 0.6 % (ref 0–3)
ERYTHROCYTE [DISTWIDTH] IN BLOOD BY AUTOMATED COUNT: 15.1 % (ref 11.3–14.5)
ERYTHROCYTE [SEDIMENTATION RATE] IN BLOOD: 70 MM/HR (ref 0–20)
GFR SERPL CREATININE-BSD FRML MDRD: 99 ML/MIN/1.73
GLOBULIN UR ELPH-MCNC: 2.5 GM/DL
GLUCOSE BLD-MCNC: 100 MG/DL (ref 70–100)
GLUCOSE UR STRIP-MCNC: NEGATIVE MG/DL
HCT VFR BLD AUTO: 43.6 % (ref 38.9–50.9)
HGB BLD-MCNC: 14.1 G/DL (ref 13.1–17.5)
HGB UR QL STRIP.AUTO: ABNORMAL
HOLD SPECIMEN: NORMAL
HOLD SPECIMEN: NORMAL
HYALINE CASTS UR QL AUTO: ABNORMAL /LPF
IMM GRANULOCYTES # BLD AUTO: 0.04 10*3/MM3 (ref 0–0.05)
IMM GRANULOCYTES NFR BLD AUTO: 0.3 % (ref 0–0.6)
KETONES UR QL STRIP: NEGATIVE
LEUKOCYTE ESTERASE UR QL STRIP.AUTO: ABNORMAL
LIPASE SERPL-CCNC: 55 U/L (ref 6–51)
LYMPHOCYTES # BLD AUTO: 1.42 10*3/MM3 (ref 0.6–4.8)
LYMPHOCYTES NFR BLD AUTO: 10 % (ref 24–44)
MCH RBC QN AUTO: 28 PG (ref 27–31)
MCHC RBC AUTO-ENTMCNC: 32.3 G/DL (ref 32–36)
MCV RBC AUTO: 86.7 FL (ref 80–99)
MONOCYTES # BLD AUTO: 1.14 10*3/MM3 (ref 0–1)
MONOCYTES NFR BLD AUTO: 8 % (ref 0–12)
NEUTROPHILS # BLD AUTO: 11.59 10*3/MM3 (ref 1.5–8.3)
NEUTROPHILS NFR BLD AUTO: 81.1 % (ref 41–71)
NITRITE UR QL STRIP: NEGATIVE
PH UR STRIP.AUTO: 7 [PH] (ref 5–8)
PLATELET # BLD AUTO: 401 10*3/MM3 (ref 150–450)
PMV BLD AUTO: 9.4 FL (ref 6–12)
POTASSIUM BLD-SCNC: 4.3 MMOL/L (ref 3.5–5.5)
PROCALCITONIN SERPL-MCNC: <0.05 NG/ML
PROT SERPL-MCNC: 7.1 G/DL (ref 5.7–8.2)
PROT UR QL STRIP: ABNORMAL
RBC # BLD AUTO: 5.03 10*6/MM3 (ref 4.2–5.76)
RBC # UR: ABNORMAL /HPF
REF LAB TEST METHOD: ABNORMAL
SODIUM BLD-SCNC: 137 MMOL/L (ref 132–146)
SP GR UR STRIP: 1.02 (ref 1–1.03)
SQUAMOUS #/AREA URNS HPF: ABNORMAL /HPF
UROBILINOGEN UR QL STRIP: ABNORMAL
WBC NRBC COR # BLD: 14.27 10*3/MM3 (ref 3.5–10.8)
WBC UR QL AUTO: ABNORMAL /HPF
WHOLE BLOOD HOLD SPECIMEN: NORMAL
WHOLE BLOOD HOLD SPECIMEN: NORMAL

## 2019-03-20 PROCEDURE — 80053 COMPREHEN METABOLIC PANEL: CPT

## 2019-03-20 PROCEDURE — 99220 PR INITIAL OBSERVATION CARE/DAY 70 MINUTES: CPT | Performed by: HOSPITALIST

## 2019-03-20 PROCEDURE — 87086 URINE CULTURE/COLONY COUNT: CPT | Performed by: HOSPITALIST

## 2019-03-20 PROCEDURE — G0378 HOSPITAL OBSERVATION PER HR: HCPCS

## 2019-03-20 PROCEDURE — 83690 ASSAY OF LIPASE: CPT

## 2019-03-20 PROCEDURE — 99285 EMERGENCY DEPT VISIT HI MDM: CPT

## 2019-03-20 PROCEDURE — 84145 PROCALCITONIN (PCT): CPT | Performed by: EMERGENCY MEDICINE

## 2019-03-20 PROCEDURE — 96365 THER/PROPH/DIAG IV INF INIT: CPT

## 2019-03-20 PROCEDURE — 96376 TX/PRO/DX INJ SAME DRUG ADON: CPT

## 2019-03-20 PROCEDURE — 96361 HYDRATE IV INFUSION ADD-ON: CPT

## 2019-03-20 PROCEDURE — 25010000002 HYDROMORPHONE PER 4 MG: Performed by: EMERGENCY MEDICINE

## 2019-03-20 PROCEDURE — 93005 ELECTROCARDIOGRAM TRACING: CPT | Performed by: HOSPITALIST

## 2019-03-20 PROCEDURE — 83605 ASSAY OF LACTIC ACID: CPT

## 2019-03-20 PROCEDURE — 85652 RBC SED RATE AUTOMATED: CPT | Performed by: EMERGENCY MEDICINE

## 2019-03-20 PROCEDURE — 85025 COMPLETE CBC W/AUTO DIFF WBC: CPT

## 2019-03-20 PROCEDURE — 96375 TX/PRO/DX INJ NEW DRUG ADDON: CPT

## 2019-03-20 PROCEDURE — 25010000002 CEFTRIAXONE PER 250 MG: Performed by: EMERGENCY MEDICINE

## 2019-03-20 PROCEDURE — 25010000002 ONDANSETRON PER 1 MG: Performed by: EMERGENCY MEDICINE

## 2019-03-20 PROCEDURE — 82150 ASSAY OF AMYLASE: CPT | Performed by: EMERGENCY MEDICINE

## 2019-03-20 PROCEDURE — 93010 ELECTROCARDIOGRAM REPORT: CPT | Performed by: INTERNAL MEDICINE

## 2019-03-20 PROCEDURE — 81001 URINALYSIS AUTO W/SCOPE: CPT

## 2019-03-20 RX ORDER — HYDROMORPHONE HYDROCHLORIDE 1 MG/ML
0.5 INJECTION, SOLUTION INTRAMUSCULAR; INTRAVENOUS; SUBCUTANEOUS
Status: DISCONTINUED | OUTPATIENT
Start: 2019-03-20 | End: 2019-03-21 | Stop reason: HOSPADM

## 2019-03-20 RX ORDER — SODIUM CHLORIDE 0.9 % (FLUSH) 0.9 %
10 SYRINGE (ML) INJECTION AS NEEDED
Status: DISCONTINUED | OUTPATIENT
Start: 2019-03-20 | End: 2019-03-21 | Stop reason: HOSPADM

## 2019-03-20 RX ORDER — ONDANSETRON 4 MG/1
4 TABLET, FILM COATED ORAL EVERY 6 HOURS PRN
Status: DISCONTINUED | OUTPATIENT
Start: 2019-03-20 | End: 2019-03-21 | Stop reason: HOSPADM

## 2019-03-20 RX ORDER — SODIUM CHLORIDE 9 MG/ML
125 INJECTION, SOLUTION INTRAVENOUS CONTINUOUS
Status: DISCONTINUED | OUTPATIENT
Start: 2019-03-20 | End: 2019-03-21

## 2019-03-20 RX ORDER — ONDANSETRON 2 MG/ML
4 INJECTION INTRAMUSCULAR; INTRAVENOUS ONCE
Status: COMPLETED | OUTPATIENT
Start: 2019-03-20 | End: 2019-03-20

## 2019-03-20 RX ORDER — SULFAMETHOXAZOLE AND TRIMETHOPRIM 800; 160 MG/1; MG/1
1 TABLET ORAL 2 TIMES DAILY
COMMUNITY
Start: 2019-03-15 | End: 2019-03-21 | Stop reason: HOSPADM

## 2019-03-20 RX ORDER — ASPIRIN 81 MG/1
162 TABLET ORAL DAILY
Status: DISCONTINUED | OUTPATIENT
Start: 2019-03-20 | End: 2019-03-21 | Stop reason: HOSPADM

## 2019-03-20 RX ORDER — SODIUM CHLORIDE 0.9 % (FLUSH) 0.9 %
3-10 SYRINGE (ML) INJECTION AS NEEDED
Status: DISCONTINUED | OUTPATIENT
Start: 2019-03-20 | End: 2019-03-21 | Stop reason: HOSPADM

## 2019-03-20 RX ORDER — ACETAMINOPHEN 500 MG
500 TABLET ORAL EVERY 6 HOURS PRN
COMMUNITY

## 2019-03-20 RX ORDER — OXYCODONE AND ACETAMINOPHEN 7.5; 325 MG/1; MG/1
1 TABLET ORAL EVERY 6 HOURS PRN
Status: DISCONTINUED | OUTPATIENT
Start: 2019-03-20 | End: 2019-03-21 | Stop reason: HOSPADM

## 2019-03-20 RX ORDER — ONDANSETRON 2 MG/ML
4 INJECTION INTRAMUSCULAR; INTRAVENOUS EVERY 6 HOURS PRN
Status: DISCONTINUED | OUTPATIENT
Start: 2019-03-20 | End: 2019-03-21 | Stop reason: HOSPADM

## 2019-03-20 RX ORDER — SODIUM CHLORIDE 0.9 % (FLUSH) 0.9 %
3 SYRINGE (ML) INJECTION EVERY 12 HOURS SCHEDULED
Status: DISCONTINUED | OUTPATIENT
Start: 2019-03-20 | End: 2019-03-21 | Stop reason: HOSPADM

## 2019-03-20 RX ORDER — LISINOPRIL 5 MG/1
2.5 TABLET ORAL
Status: DISCONTINUED | OUTPATIENT
Start: 2019-03-20 | End: 2019-03-21 | Stop reason: HOSPADM

## 2019-03-20 RX ORDER — HYDROMORPHONE HYDROCHLORIDE 1 MG/ML
0.5 INJECTION, SOLUTION INTRAMUSCULAR; INTRAVENOUS; SUBCUTANEOUS ONCE
Status: COMPLETED | OUTPATIENT
Start: 2019-03-20 | End: 2019-03-20

## 2019-03-20 RX ORDER — ACETAMINOPHEN 500 MG
500 TABLET ORAL EVERY 6 HOURS PRN
Status: DISCONTINUED | OUTPATIENT
Start: 2019-03-20 | End: 2019-03-21 | Stop reason: HOSPADM

## 2019-03-20 RX ORDER — SODIUM CHLORIDE 9 MG/ML
75 INJECTION, SOLUTION INTRAVENOUS CONTINUOUS
Status: DISCONTINUED | OUTPATIENT
Start: 2019-03-20 | End: 2019-03-21

## 2019-03-20 RX ORDER — DILTIAZEM HYDROCHLORIDE 180 MG/1
180 CAPSULE, COATED, EXTENDED RELEASE ORAL
Status: DISCONTINUED | OUTPATIENT
Start: 2019-03-20 | End: 2019-03-21 | Stop reason: HOSPADM

## 2019-03-20 RX ORDER — CEFTRIAXONE SODIUM 1 G/50ML
1 INJECTION, SOLUTION INTRAVENOUS ONCE
Status: COMPLETED | OUTPATIENT
Start: 2019-03-20 | End: 2019-03-20

## 2019-03-20 RX ADMIN — ONDANSETRON 4 MG: 2 INJECTION INTRAMUSCULAR; INTRAVENOUS at 10:27

## 2019-03-20 RX ADMIN — SODIUM CHLORIDE 125 ML/HR: 9 INJECTION, SOLUTION INTRAVENOUS at 15:07

## 2019-03-20 RX ADMIN — SODIUM CHLORIDE 125 ML/HR: 9 INJECTION, SOLUTION INTRAVENOUS at 22:34

## 2019-03-20 RX ADMIN — SODIUM CHLORIDE 1000 ML: 9 INJECTION, SOLUTION INTRAVENOUS at 12:32

## 2019-03-20 RX ADMIN — SODIUM CHLORIDE 75 ML/HR: 9 INJECTION, SOLUTION INTRAVENOUS at 22:34

## 2019-03-20 RX ADMIN — HYDROMORPHONE HYDROCHLORIDE 0.5 MG: 1 INJECTION, SOLUTION INTRAMUSCULAR; INTRAVENOUS; SUBCUTANEOUS at 10:29

## 2019-03-20 RX ADMIN — LISINOPRIL 2.5 MG: 5 TABLET ORAL at 18:04

## 2019-03-20 RX ADMIN — HYDROMORPHONE HYDROCHLORIDE 0.5 MG: 1 INJECTION, SOLUTION INTRAMUSCULAR; INTRAVENOUS; SUBCUTANEOUS at 15:10

## 2019-03-20 RX ADMIN — ASPIRIN 162 MG: 81 TABLET, COATED ORAL at 18:53

## 2019-03-20 RX ADMIN — ACETAMINOPHEN 500 MG: 500 TABLET, FILM COATED ORAL at 22:27

## 2019-03-20 RX ADMIN — OXYCODONE HYDROCHLORIDE AND ACETAMINOPHEN 1 TABLET: 7.5; 325 TABLET ORAL at 18:53

## 2019-03-20 RX ADMIN — HYDROMORPHONE HYDROCHLORIDE 0.5 MG: 1 INJECTION, SOLUTION INTRAMUSCULAR; INTRAVENOUS; SUBCUTANEOUS at 12:34

## 2019-03-20 RX ADMIN — SODIUM CHLORIDE, POTASSIUM CHLORIDE, SODIUM LACTATE AND CALCIUM CHLORIDE 1000 ML: 600; 310; 30; 20 INJECTION, SOLUTION INTRAVENOUS at 10:26

## 2019-03-20 RX ADMIN — CEFTRIAXONE SODIUM 1 G: 1 INJECTION, SOLUTION INTRAVENOUS at 12:36

## 2019-03-20 RX ADMIN — METOPROLOL TARTRATE 12.5 MG: 25 TABLET ORAL at 18:05

## 2019-03-20 RX ADMIN — DILTIAZEM HYDROCHLORIDE 180 MG: 180 CAPSULE, COATED, EXTENDED RELEASE ORAL at 18:04
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
## 2019-03-20 NOTE — OUTREACH NOTE
COPD/PN Week 2 Survey      Responses   Facility patient discharged from?  [facility]   Does the patient have one of the following disease processes/diagnoses(primary or secondary)?  COPD/Pneumonia   Was the primary reason for admission:  COPD exacerbation   Week 2 attempt successful?  No   Revoke  Readmitted          Nanci Reyna RN
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
## 2019-03-21 VITALS
SYSTOLIC BLOOD PRESSURE: 122 MMHG | BODY MASS INDEX: 18.84 KG/M2 | OXYGEN SATURATION: 95 % | DIASTOLIC BLOOD PRESSURE: 76 MMHG | RESPIRATION RATE: 16 BRPM | TEMPERATURE: 97.4 F | HEART RATE: 67 BPM | WEIGHT: 117.2 LBS | HEIGHT: 66 IN

## 2019-03-21 PROBLEM — R19.7 NAUSEA, VOMITING, AND DIARRHEA: Status: RESOLVED | Noted: 2019-03-20 | Resolved: 2019-03-21

## 2019-03-21 PROBLEM — N39.0 ACUTE UTI (URINARY TRACT INFECTION): Status: RESOLVED | Noted: 2019-03-20 | Resolved: 2019-03-21

## 2019-03-21 PROBLEM — R11.2 NAUSEA, VOMITING, AND DIARRHEA: Status: RESOLVED | Noted: 2019-03-20 | Resolved: 2019-03-21

## 2019-03-21 PROBLEM — D72.829 LEUKOCYTOSIS: Status: RESOLVED | Noted: 2019-03-20 | Resolved: 2019-03-21

## 2019-03-21 LAB
ANION GAP SERPL CALCULATED.3IONS-SCNC: 4 MMOL/L (ref 3–11)
BUN BLD-MCNC: 13 MG/DL (ref 9–23)
BUN/CREAT SERPL: 21 (ref 7–25)
CALCIUM SPEC-SCNC: 8.1 MG/DL (ref 8.7–10.4)
CHLORIDE SERPL-SCNC: 110 MMOL/L (ref 99–109)
CO2 SERPL-SCNC: 22 MMOL/L (ref 20–31)
CREAT BLD-MCNC: 0.62 MG/DL (ref 0.6–1.3)
DEPRECATED RDW RBC AUTO: 48.6 FL (ref 37–54)
ERYTHROCYTE [DISTWIDTH] IN BLOOD BY AUTOMATED COUNT: 15.2 % (ref 11.3–14.5)
GFR SERPL CREATININE-BSD FRML MDRD: 131 ML/MIN/1.73
GLUCOSE BLD-MCNC: 103 MG/DL (ref 70–100)
HCT VFR BLD AUTO: 35.1 % (ref 38.9–50.9)
HGB BLD-MCNC: 11.4 G/DL (ref 13.1–17.5)
MCH RBC QN AUTO: 28 PG (ref 27–31)
MCHC RBC AUTO-ENTMCNC: 32.5 G/DL (ref 32–36)
MCV RBC AUTO: 86.2 FL (ref 80–99)
PLATELET # BLD AUTO: 355 10*3/MM3 (ref 150–450)
PMV BLD AUTO: 9.2 FL (ref 6–12)
POTASSIUM BLD-SCNC: 4.1 MMOL/L (ref 3.5–5.5)
RBC # BLD AUTO: 4.07 10*6/MM3 (ref 4.2–5.76)
SODIUM BLD-SCNC: 136 MMOL/L (ref 132–146)
WBC NRBC COR # BLD: 9.97 10*3/MM3 (ref 3.5–10.8)

## 2019-03-21 PROCEDURE — 97166 OT EVAL MOD COMPLEX 45 MIN: CPT

## 2019-03-21 PROCEDURE — 97161 PT EVAL LOW COMPLEX 20 MIN: CPT

## 2019-03-21 PROCEDURE — 85027 COMPLETE CBC AUTOMATED: CPT | Performed by: HOSPITALIST

## 2019-03-21 PROCEDURE — G0378 HOSPITAL OBSERVATION PER HR: HCPCS

## 2019-03-21 PROCEDURE — 99217 PR OBSERVATION CARE DISCHARGE MANAGEMENT: CPT | Performed by: FAMILY MEDICINE

## 2019-03-21 PROCEDURE — 80048 BASIC METABOLIC PNL TOTAL CA: CPT | Performed by: HOSPITALIST

## 2019-03-21 RX ORDER — CEFUROXIME AXETIL 250 MG/1
500 TABLET ORAL EVERY 12 HOURS SCHEDULED
Status: DISCONTINUED | OUTPATIENT
Start: 2019-03-21 | End: 2019-03-21 | Stop reason: HOSPADM

## 2019-03-21 RX ORDER — OXYCODONE AND ACETAMINOPHEN 7.5; 325 MG/1; MG/1
1 TABLET ORAL EVERY 6 HOURS PRN
Qty: 15 TABLET | Refills: 0 | Status: SHIPPED | OUTPATIENT
Start: 2019-03-21

## 2019-03-21 RX ADMIN — ASPIRIN 162 MG: 81 TABLET, COATED ORAL at 09:55

## 2019-03-21 RX ADMIN — OXYCODONE HYDROCHLORIDE AND ACETAMINOPHEN 1 TABLET: 7.5; 325 TABLET ORAL at 10:57

## 2019-03-21 RX ADMIN — DILTIAZEM HYDROCHLORIDE 180 MG: 180 CAPSULE, COATED, EXTENDED RELEASE ORAL at 09:57

## 2019-03-21 RX ADMIN — LISINOPRIL 2.5 MG: 5 TABLET ORAL at 09:56

## 2019-03-21 RX ADMIN — METOPROLOL TARTRATE 12.5 MG: 25 TABLET ORAL at 09:56

## 2019-03-21 RX ADMIN — OXYCODONE HYDROCHLORIDE AND ACETAMINOPHEN 1 TABLET: 7.5; 325 TABLET ORAL at 02:57

## 2019-03-21 NOTE — PLAN OF CARE
Problem: Patient Care Overview  Goal: Plan of Care Review   03/21/19 0030   Coping/Psychosocial   Plan of Care Reviewed With patient   Plan of Care Review   Progress no change       Problem: Urinary Tract Infection (Adult)  Goal: Signs and Symptoms of Listed Potential Problems Will be Absent, Minimized or Managed (Urinary Tract Infection)   03/21/19 0030   Goal/Outcome Evaluation   Problems Assessed (Urinary Tract Infection) pain   Problems Present (UTI) pain       Problem: Skin Injury Risk (Adult)  Goal: Identify Related Risk Factors and Signs and Symptoms   03/21/19 0030   Skin Injury Risk (Adult)   Related Risk Factors (Skin Injury Risk) fluid intake inadequate;nutritional deficiencies       Problem: Fall Risk (Adult)  Goal: Identify Related Risk Factors and Signs and Symptoms   03/21/19 0030   Fall Risk (Adult)   Related Risk Factors (Fall Risk) environment unfamiliar;history of falls

## 2019-03-21 NOTE — PROGRESS NOTES
Discharge Planning Assessment  [facility]     Patient Name: Luther Martines  MRN: [id number]  Today's Date: 3/21/2019    Admit Date: 3/20/2019    Discharge Needs Assessment     Row Name 03/21/19 1012       Living Environment    Lives With  child(fredis), adult;spouse    Current Living Arrangements  home/apartment/condo    Primary Care Provided by  self    Able to Return to Prior Arrangements  yes       Transition Planning    Patient/Family Anticipates Transition to  home with family    Transportation Anticipated  family or friend will provide       Discharge Needs Assessment    Readmission Within the Last 30 Days  previous discharge plan unsuccessful    Equipment Currently Used at Home  cane, straight;oxygen;shower chair;nebulizer    Anticipated Changes Related to Illness  none    Equipment Needed After Discharge  none    Current Discharge Risk  dependent with mobility/activities of daily living;physical impairment        Discharge Plan     Row Name 03/21/19 1013       Plan    Plan  home    Patient/Family in Agreement with Plan  yes    Plan Comments  I met with Mr. Martines and his son at the bedside. He lives in [facility] with his wife and daughters. He ambulates with a cane at baseline. He denies having any needs for additional DME or home health. OT has already evaluated and signed off as he is at baseline functioning. He has no anticipated discharge needs at this time.    Final Discharge Disposition Code  01 - home or self-care        Destination      No service coordination in this encounter.      Durable Medical Equipment      Service Provider Request Status Selected Services Address Phone Number Fax Number    [facility] Pending - No Request Sent N/A [address] [facility] [address] [phone]      Dialysis/Infusion      No service coordination in this encounter.      Home Medical Care      No service coordination in this encounter.      Community Resources      No service  coordination in this encounter.          Demographic Summary     Row Name 03/21/19 1011       General Information    Referral Source  admission list    General Information Comments  I confirmed that [employer] is Mr. Martines's insurer. Amrit Sosa is his PCP, however he states that he is going to find a new PCP.        Functional Status     Row Name 03/21/19 1012       Functional Status    Usual Activity Tolerance  moderate       Functional Status, IADL    Medications  assistive person    Meal Preparation  independent    Housekeeping  completely dependent    Laundry  completely dependent    Shopping  completely dependent        Psychosocial    No documentation.       Abuse/Neglect    No documentation.       Legal    No documentation.       Substance Abuse    No documentation.       Patient Forms    No documentation.           Rafael Kincaid

## 2019-03-21 NOTE — PLAN OF CARE
Problem: Patient Care Overview  Goal: Plan of Care Review  Outcome: Ongoing (interventions implemented as appropriate)   03/21/19 0912   Coping/Psychosocial   Plan of Care Reviewed With patient   Plan of Care Review   Progress improving   OTHER   Outcome Summary OT eval completed Pt CGA bed to bathroom with IV pole, CGA progressed to supervision bed mobility, set up for LBD of socks, good sitting balance and strength RUE, Pt reports feels at baseline functional ADL performance and no further IPOT recom at this time will reconsult if status changes recom home with assist

## 2019-03-21 NOTE — PLAN OF CARE
Problem: Patient Care Overview  Goal: Plan of Care Review  Outcome: Ongoing (interventions implemented as appropriate)   03/21/19 3891   Coping/Psychosocial   Plan of Care Reviewed With patient   OTHER   Outcome Summary Pt ambulates in room, holding IV pole himself. Pt has no difficulty performing t/f, bed mobility. Presents with baseline level of weakness in L side. Pt advised by PT to continue getting up in room as he has to go to bathroom and stay active and ask for reconsult of PT if he noticies changes. PT to sign off.
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
## 2019-03-22 ENCOUNTER — READMISSION MANAGEMENT (OUTPATIENT)
Dept: CALL CENTER | Facility: HOSPITAL | Age: 64
End: 2019-03-22

## 2019-03-22 LAB — BACTERIA SPEC AEROBE CULT: NORMAL

## 2019-03-22 NOTE — OUTREACH NOTE
Prep Survey      Responses   Facility patient discharged from?  [facility]   Is patient eligible?  Yes   Discharge diagnosis  Acute UTI, COPD   Does the patient have one of the following disease processes/diagnoses(primary or secondary)?  COPD/Pneumonia   Does the patient have Home health ordered?  No   Is there a DME ordered?  No   Prep survey completed?  Yes          Eileen Mishra RN

## 2019-03-23 ENCOUNTER — READMISSION MANAGEMENT (OUTPATIENT)
Dept: CALL CENTER | Facility: HOSPITAL | Age: 64
End: 2019-03-23

## 2019-03-23 NOTE — OUTREACH NOTE
COPD/PN Week 1 Survey      Responses   Facility patient discharged from?  [facility]   Does the patient have one of the following disease processes/diagnoses(primary or secondary)?  COPD/Pneumonia   Is there a successful TCM telephone encounter documented?  No   Was the primary reason for admission:  COPD exacerbation   Week 1 attempt successful?  Yes   Call start time  1356   Call end time  1358   Discharge diagnosis  Acute UTI, COPD   Is patient permission given to speak with other caregiver?  Yes   List who call center can speak with  Champ- Wife   Meds reviewed with patient/caregiver?  Yes   Is the patient having any side effects they believe may be caused by any medication additions or changes?  No   Does the patient have all medications ordered at discharge?  Yes   Is the patient taking all medications as directed (includes completed medication regime)?  Yes   Does the patient have a primary care provider?   Yes   Does the patient have an appointment with their PCP or pulmonologist within 7 days of discharge?  Yes   Has the patient kept scheduled appointments due by today?  N/A   What DME was ordered?  oxygen   Has all DME been delivered?  Yes   Psychosocial issues?  No   Did the patient receive a copy of their discharge instructions?  Yes   Nursing interventions  Reviewed instructions with patient   What is the patient's perception of their health status since discharge?  Improving   Nursing Interventions  Nurse provided patient education   Are the patient's immunizations up to date?   Yes   Nursing interventions  Educated on importance of maintaining up to date immunizations as advised by provider   Is the patient/caregiver able to teach back the hierarchy of who to call/visit for symptoms/problems? PCP, Specialist, Home health nurse, Urgent Care, ED, 911  Yes   Is the patient able to teach back COPD zones?  Yes   Nursing interventions  Education provided on various zones   Patient reports what zone on  this call?  Green Zone   Green Zone  Reports doing well, Breathing without shortness of breath, Usual activity and exercise level, Usual amount of phlegm/mucus without difficulty coughing up, Sleeping well, Appetite is good   [facility] interventions:  Take daily medications, Continue regular exercise/diet plan, Do not smoke, Use oxygen as prescribed, Avoid indoor/outdoor triggers   Week 1 call completed?  Yes          Celine Mcfarland RN

## 2019-03-24 LAB
BACTERIA SPEC AEROBE CULT: NORMAL
BACTERIA SPEC AEROBE CULT: NORMAL

## 2019-03-30 ENCOUNTER — READMISSION MANAGEMENT (OUTPATIENT)
Dept: CALL CENTER | Facility: HOSPITAL | Age: 64
End: 2019-03-30

## 2019-03-30 NOTE — OUTREACH NOTE
COPD/PN Week 2 Survey      Responses   Facility patient discharged from?  [facility]   Does the patient have one of the following disease processes/diagnoses(primary or secondary)?  COPD/Pneumonia   Was the primary reason for admission:  COPD exacerbation   Week 2 attempt successful?  Yes   Call start time  1552   Call end time  1557   Discharge diagnosis  Acute UTI, COPD   Meds reviewed with patient/caregiver?  Yes   Is the patient taking all medications as directed (includes completed medication regime)?  Yes   Has the patient kept scheduled appointments due by today?  N/A   What is the patient's perception of their health status since discharge?  New symptoms unrelated to diagnosis   Nursing Interventions  Nurse provided patient education   Is the patient/caregiver able to teach back the hierarchy of who to call/visit for symptoms/problems? PCP, Specialist, Home health nurse, Urgent Care, ED, 911  Yes   Additional teach back comments  pt doing well--has some SOA but at his baseline. Is having some burning with urination r/t his bladder procedure. Enc good fluid intake.   Is the patient able to teach back COPD zones?  Yes   Nursing interventions  Education provided on various zones   Patient reports what zone on this call?  Green Zone   Green Zone  Reports doing well, Usual activity and exercise level, Usual amount of phlegm/mucus without difficulty coughing up   Green Zone interventions:  Take daily medications, Continue regular exercise/diet plan   Week 2 call completed?  Yes          Padmini Sanders RN

## 2019-04-06 ENCOUNTER — READMISSION MANAGEMENT (OUTPATIENT)
Dept: CALL CENTER | Facility: HOSPITAL | Age: 64
End: 2019-04-06

## 2019-04-06 NOTE — OUTREACH NOTE
COPD/PN Week 3 Survey      Responses   Facility patient discharged from?  [facility]   Does the patient have one of the following disease processes/diagnoses(primary or secondary)?  COPD/Pneumonia   Was the primary reason for admission:  COPD exacerbation   Week 3 attempt successful?  No   Unsuccessful attempts  Attempt 1          Jewel Bellamy RN

## 2019-04-08 ENCOUNTER — READMISSION MANAGEMENT (OUTPATIENT)
Dept: CALL CENTER | Facility: HOSPITAL | Age: 64
End: 2019-04-08

## 2019-04-08 NOTE — OUTREACH NOTE
COPD/PN Week 3 Survey      Responses   Facility patient discharged from?  [facility]   Does the patient have one of the following disease processes/diagnoses(primary or secondary)?  COPD/Pneumonia   Was the primary reason for admission:  COPD exacerbation   Week 3 attempt successful?  No   Unsuccessful attempts  Attempt 2          Chadwick Jones RN

## 2019-05-04 ENCOUNTER — APPOINTMENT (OUTPATIENT)
Dept: GENERAL RADIOLOGY | Facility: HOSPITAL | Age: 64
End: 2019-05-04

## 2019-05-04 ENCOUNTER — HOSPITAL ENCOUNTER (EMERGENCY)
Facility: HOSPITAL | Age: 64
Discharge: HOME | End: 2019-05-04
Attending: EMERGENCY MEDICINE | Admitting: EMERGENCY MEDICINE

## 2019-05-04 VITALS
OXYGEN SATURATION: 97 % | SYSTOLIC BLOOD PRESSURE: 134 MMHG | HEART RATE: 83 BPM | BODY MASS INDEX: 19.29 KG/M2 | RESPIRATION RATE: 18 BRPM | TEMPERATURE: 98.7 F | WEIGHT: 120 LBS | HEIGHT: 66 IN | DIASTOLIC BLOOD PRESSURE: 94 MMHG

## 2019-05-04 DIAGNOSIS — J44.1 COPD WITH ACUTE EXACERBATION (HCC): Primary | ICD-10-CM

## 2019-05-04 DIAGNOSIS — F41.9 ANXIETY: ICD-10-CM

## 2019-05-04 LAB
ALBUMIN SERPL-MCNC: 4.5 G/DL (ref 3.5–5.2)
ALBUMIN/GLOB SERPL: 1.5 G/DL
ALP SERPL-CCNC: 85 U/L (ref 39–117)
ALT SERPL W P-5'-P-CCNC: 5 U/L (ref 1–41)
ANION GAP SERPL CALCULATED.3IONS-SCNC: 14 MMOL/L
AST SERPL-CCNC: 10 U/L (ref 1–40)
BASOPHILS # BLD AUTO: 0.06 10*3/MM3 (ref 0–0.2)
BASOPHILS NFR BLD AUTO: 0.7 % (ref 0–1.5)
BILIRUB SERPL-MCNC: 0.4 MG/DL (ref 0.2–1.2)
BUN BLD-MCNC: 8 MG/DL (ref 8–23)
BUN/CREAT SERPL: 12.5 (ref 7–25)
CALCIUM SPEC-SCNC: 9.9 MG/DL (ref 8.6–10.5)
CHLORIDE SERPL-SCNC: 98 MMOL/L (ref 98–107)
CO2 SERPL-SCNC: 22 MMOL/L (ref 22–29)
CREAT BLD-MCNC: 0.64 MG/DL (ref 0.76–1.27)
DEPRECATED RDW RBC AUTO: 42.2 FL (ref 37–54)
EOSINOPHIL # BLD AUTO: 0.16 10*3/MM3 (ref 0–0.4)
EOSINOPHIL NFR BLD AUTO: 1.8 % (ref 0.3–6.2)
ERYTHROCYTE [DISTWIDTH] IN BLOOD BY AUTOMATED COUNT: 13.9 % (ref 12.3–15.4)
GFR SERPL CREATININE-BSD FRML MDRD: 126 ML/MIN/1.73
GLOBULIN UR ELPH-MCNC: 3.1 GM/DL
GLUCOSE BLD-MCNC: 112 MG/DL (ref 65–99)
HCT VFR BLD AUTO: 43.2 % (ref 37.5–51)
HGB BLD-MCNC: 14.6 G/DL (ref 13–17.7)
HOLD SPECIMEN: NORMAL
HOLD SPECIMEN: NORMAL
IMM GRANULOCYTES # BLD AUTO: 0.01 10*3/MM3 (ref 0–0.05)
IMM GRANULOCYTES NFR BLD AUTO: 0.1 % (ref 0–0.5)
LYMPHOCYTES # BLD AUTO: 2.03 10*3/MM3 (ref 0.7–3.1)
LYMPHOCYTES NFR BLD AUTO: 22.3 % (ref 19.6–45.3)
MCH RBC QN AUTO: 28 PG (ref 26.6–33)
MCHC RBC AUTO-ENTMCNC: 33.8 G/DL (ref 31.5–35.7)
MCV RBC AUTO: 82.9 FL (ref 79–97)
MONOCYTES # BLD AUTO: 1.09 10*3/MM3 (ref 0.1–0.9)
MONOCYTES NFR BLD AUTO: 12 % (ref 5–12)
NEUTROPHILS # BLD AUTO: 5.75 10*3/MM3 (ref 1.7–7)
NEUTROPHILS NFR BLD AUTO: 63.1 % (ref 42.7–76)
NT-PROBNP SERPL-MCNC: 94.9 PG/ML (ref 5–900)
PLATELET # BLD AUTO: 381 10*3/MM3 (ref 140–450)
PMV BLD AUTO: 9.1 FL (ref 6–12)
POTASSIUM BLD-SCNC: 4.3 MMOL/L (ref 3.5–5.2)
PROT SERPL-MCNC: 7.6 G/DL (ref 6–8.5)
RBC # BLD AUTO: 5.21 10*6/MM3 (ref 4.14–5.8)
SODIUM BLD-SCNC: 134 MMOL/L (ref 136–145)
TROPONIN T SERPL-MCNC: <0.01 NG/ML (ref 0–0.03)
WBC NRBC COR # BLD: 9.1 10*3/MM3 (ref 3.4–10.8)
WHOLE BLOOD HOLD SPECIMEN: NORMAL
WHOLE BLOOD HOLD SPECIMEN: NORMAL

## 2019-05-04 PROCEDURE — 85025 COMPLETE CBC W/AUTO DIFF WBC: CPT

## 2019-05-04 PROCEDURE — 99284 EMERGENCY DEPT VISIT MOD MDM: CPT

## 2019-05-04 PROCEDURE — 63710000001 PREDNISONE PER 1 MG: Performed by: EMERGENCY MEDICINE

## 2019-05-04 PROCEDURE — 71045 X-RAY EXAM CHEST 1 VIEW: CPT

## 2019-05-04 PROCEDURE — 94640 AIRWAY INHALATION TREATMENT: CPT

## 2019-05-04 PROCEDURE — 80053 COMPREHEN METABOLIC PANEL: CPT

## 2019-05-04 PROCEDURE — 83880 ASSAY OF NATRIURETIC PEPTIDE: CPT

## 2019-05-04 PROCEDURE — 84484 ASSAY OF TROPONIN QUANT: CPT

## 2019-05-04 PROCEDURE — 93005 ELECTROCARDIOGRAM TRACING: CPT | Performed by: EMERGENCY MEDICINE

## 2019-05-04 RX ORDER — LORAZEPAM 0.5 MG/1
0.5 TABLET ORAL ONCE
Status: COMPLETED | OUTPATIENT
Start: 2019-05-04 | End: 2019-05-04

## 2019-05-04 RX ORDER — PREDNISONE 20 MG/1
40 TABLET ORAL ONCE
Status: COMPLETED | OUTPATIENT
Start: 2019-05-04 | End: 2019-05-04

## 2019-05-04 RX ORDER — SODIUM CHLORIDE 0.9 % (FLUSH) 0.9 %
10 SYRINGE (ML) INJECTION AS NEEDED
Status: DISCONTINUED | OUTPATIENT
Start: 2019-05-04 | End: 2019-05-04 | Stop reason: HOSPADM

## 2019-05-04 RX ORDER — PREDNISONE 20 MG/1
20 TABLET ORAL 2 TIMES DAILY
Qty: 6 TABLET | Refills: 0 | Status: SHIPPED | OUTPATIENT
Start: 2019-05-04 | End: 2019-05-07

## 2019-05-04 RX ORDER — CITALOPRAM 10 MG/1
10 TABLET ORAL DAILY
Qty: 15 TABLET | Refills: 0 | Status: SHIPPED | OUTPATIENT
Start: 2019-05-04 | End: 2019-05-15

## 2019-05-04 RX ORDER — IPRATROPIUM BROMIDE AND ALBUTEROL SULFATE 2.5; .5 MG/3ML; MG/3ML
3 SOLUTION RESPIRATORY (INHALATION) ONCE
Status: COMPLETED | OUTPATIENT
Start: 2019-05-04 | End: 2019-05-04

## 2019-05-04 RX ADMIN — IPRATROPIUM BROMIDE AND ALBUTEROL SULFATE 3 ML: 2.5; .5 SOLUTION RESPIRATORY (INHALATION) at 11:14

## 2019-05-04 RX ADMIN — PREDNISONE 40 MG: 20 TABLET ORAL at 10:51

## 2019-05-04 RX ADMIN — LORAZEPAM 0.5 MG: 0.5 TABLET ORAL at 10:51
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
## 2019-05-07 ENCOUNTER — TELEPHONE (OUTPATIENT)
Dept: INTERNAL MEDICINE | Facility: CLINIC | Age: 64
End: 2019-05-07

## 2019-05-15 ENCOUNTER — HOSPITAL ENCOUNTER (OUTPATIENT)
Dept: GENERAL RADIOLOGY | Facility: HOSPITAL | Age: 64
Discharge: HOME | End: 2019-05-15
Admitting: INTERNAL MEDICINE

## 2019-05-15 ENCOUNTER — OFFICE VISIT (OUTPATIENT)
Dept: FAMILY MEDICINE CLINIC | Facility: CLINIC | Age: 64
End: 2019-05-15

## 2019-05-15 ENCOUNTER — APPOINTMENT (OUTPATIENT)
Dept: LAB | Facility: HOSPITAL | Age: 64
End: 2019-05-15

## 2019-05-15 VITALS
HEART RATE: 80 BPM | SYSTOLIC BLOOD PRESSURE: 128 MMHG | OXYGEN SATURATION: 98 % | TEMPERATURE: 98.6 F | BODY MASS INDEX: 17.36 KG/M2 | DIASTOLIC BLOOD PRESSURE: 70 MMHG | WEIGHT: 108 LBS | HEIGHT: 66 IN

## 2019-05-15 DIAGNOSIS — J44.9 CHRONIC OBSTRUCTIVE PULMONARY DISEASE, UNSPECIFIED COPD TYPE (HCC): Primary | ICD-10-CM

## 2019-05-15 DIAGNOSIS — M25.512 CHRONIC LEFT SHOULDER PAIN: ICD-10-CM

## 2019-05-15 DIAGNOSIS — R40.0 DAYTIME SLEEPINESS: ICD-10-CM

## 2019-05-15 DIAGNOSIS — G89.29 CHRONIC LEFT SHOULDER PAIN: ICD-10-CM

## 2019-05-15 DIAGNOSIS — M25.562 CHRONIC PAIN OF LEFT KNEE: ICD-10-CM

## 2019-05-15 DIAGNOSIS — I48.0 PAROXYSMAL ATRIAL FIBRILLATION (HCC): ICD-10-CM

## 2019-05-15 DIAGNOSIS — G47.10 HYPERSOMNIA: ICD-10-CM

## 2019-05-15 DIAGNOSIS — G89.29 CHRONIC PAIN OF LEFT KNEE: ICD-10-CM

## 2019-05-15 DIAGNOSIS — F51.01 PRIMARY INSOMNIA: ICD-10-CM

## 2019-05-15 DIAGNOSIS — I50.22 CHRONIC SYSTOLIC HEART FAILURE (HCC): ICD-10-CM

## 2019-05-15 DIAGNOSIS — F41.1 GENERALIZED ANXIETY DISORDER: ICD-10-CM

## 2019-05-15 DIAGNOSIS — R79.9 ABNORMAL FINDING OF BLOOD CHEMISTRY: ICD-10-CM

## 2019-05-15 PROBLEM — G47.30 SLEEP APNEA: Status: ACTIVE | Noted: 2019-05-15

## 2019-05-15 PROCEDURE — 73030 X-RAY EXAM OF SHOULDER: CPT

## 2019-05-15 PROCEDURE — 73560 X-RAY EXAM OF KNEE 1 OR 2: CPT

## 2019-05-15 PROCEDURE — 99205 OFFICE O/P NEW HI 60 MIN: CPT | Performed by: INTERNAL MEDICINE

## 2019-05-15 RX ORDER — FAMOTIDINE 20 MG/1
20 TABLET, FILM COATED ORAL 2 TIMES DAILY
Qty: 60 TABLET | Refills: 5 | Status: SHIPPED | OUTPATIENT
Start: 2019-05-15

## 2019-05-15 RX ORDER — LISINOPRIL 2.5 MG/1
2.5 TABLET ORAL
Qty: 30 TABLET | Refills: 1 | Status: SHIPPED | OUTPATIENT
Start: 2019-05-15 | End: 2019-07-15 | Stop reason: SDUPTHER

## 2019-05-15 RX ORDER — DILTIAZEM HYDROCHLORIDE 180 MG/1
180 CAPSULE, COATED, EXTENDED RELEASE ORAL
Qty: 30 CAPSULE | Refills: 1 | Status: SHIPPED | OUTPATIENT
Start: 2019-05-15 | End: 2019-05-17 | Stop reason: SDUPTHER

## 2019-05-15 RX ORDER — HYDROXYZINE 50 MG/1
50 TABLET, FILM COATED ORAL NIGHTLY PRN
Qty: 30 TABLET | Refills: 5 | Status: SHIPPED | OUTPATIENT
Start: 2019-05-15 | End: 2019-12-11 | Stop reason: SDUPTHER

## 2019-05-15 RX ORDER — ESCITALOPRAM OXALATE 10 MG/1
10 TABLET ORAL DAILY
Qty: 30 TABLET | Refills: 5 | Status: SHIPPED | OUTPATIENT
Start: 2019-05-15
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
## 2019-05-16 ENCOUNTER — OFFICE VISIT (OUTPATIENT)
Dept: PULMONOLOGY | Facility: CLINIC | Age: 64
End: 2019-05-16

## 2019-05-16 DIAGNOSIS — J44.9 CHRONIC OBSTRUCTIVE PULMONARY DISEASE, UNSPECIFIED COPD TYPE (HCC): ICD-10-CM

## 2019-05-16 LAB
CHOLEST SERPL-MCNC: 199 MG/DL (ref 0–200)
HBA1C MFR BLD: 5.5 % (ref 4.8–5.6)
HDLC SERPL-MCNC: 51 MG/DL (ref 40–60)
LDLC SERPL CALC-MCNC: 126 MG/DL (ref 0–100)
TRIGL SERPL-MCNC: 110 MG/DL (ref 0–150)
TSH SERPL DL<=0.005 MIU/L-ACNC: 1.13 MIU/ML (ref 0.27–4.2)
VLDLC SERPL CALC-MCNC: 22 MG/DL (ref 5–40)

## 2019-05-16 PROCEDURE — 94060 EVALUATION OF WHEEZING: CPT | Performed by: INTERNAL MEDICINE

## 2019-05-16 PROCEDURE — 94729 DIFFUSING CAPACITY: CPT | Performed by: INTERNAL MEDICINE

## 2019-05-16 PROCEDURE — 94726 PLETHYSMOGRAPHY LUNG VOLUMES: CPT | Performed by: INTERNAL MEDICINE

## 2019-05-16 RX ORDER — ALBUTEROL SULFATE 90 UG/1
4 AEROSOL, METERED RESPIRATORY (INHALATION) ONCE
Status: COMPLETED | OUTPATIENT
Start: 2019-05-16 | End: 2019-05-16

## 2019-05-16 RX ADMIN — ALBUTEROL SULFATE 4 PUFF: 90 AEROSOL, METERED RESPIRATORY (INHALATION) at 08:38

## 2019-05-17 ENCOUNTER — OFFICE VISIT (OUTPATIENT)
Dept: CARDIOLOGY | Facility: HOSPITAL | Age: 64
End: 2019-05-17

## 2019-05-17 VITALS
DIASTOLIC BLOOD PRESSURE: 59 MMHG | SYSTOLIC BLOOD PRESSURE: 120 MMHG | HEART RATE: 52 BPM | TEMPERATURE: 97.3 F | WEIGHT: 110 LBS | HEIGHT: 66 IN | BODY MASS INDEX: 17.68 KG/M2 | OXYGEN SATURATION: 95 %

## 2019-05-17 DIAGNOSIS — I48.0 PAF (PAROXYSMAL ATRIAL FIBRILLATION) (HCC): Primary | ICD-10-CM

## 2019-05-17 DIAGNOSIS — I10 ESSENTIAL HYPERTENSION: ICD-10-CM

## 2019-05-17 DIAGNOSIS — I42.9 CARDIOMYOPATHY, UNSPECIFIED TYPE (HCC): ICD-10-CM

## 2019-05-17 PROBLEM — G45.9 TIA (TRANSIENT ISCHEMIC ATTACK): Status: ACTIVE | Noted: 2019-05-17

## 2019-05-17 PROCEDURE — 99214 OFFICE O/P EST MOD 30 MIN: CPT | Performed by: NURSE PRACTITIONER

## 2019-05-17 RX ORDER — DILTIAZEM HYDROCHLORIDE 120 MG/1
120 CAPSULE, COATED, EXTENDED RELEASE ORAL
Qty: 30 CAPSULE | Refills: 3 | Status: ON HOLD | OUTPATIENT
Start: 2019-05-17 | End: 2019-09-04
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
## 2019-05-22 ENCOUNTER — OFFICE VISIT (OUTPATIENT)
Dept: FAMILY MEDICINE CLINIC | Facility: CLINIC | Age: 64
End: 2019-05-22

## 2019-05-22 VITALS
HEIGHT: 66 IN | OXYGEN SATURATION: 97 % | DIASTOLIC BLOOD PRESSURE: 72 MMHG | SYSTOLIC BLOOD PRESSURE: 110 MMHG | BODY MASS INDEX: 18 KG/M2 | WEIGHT: 112 LBS | HEART RATE: 65 BPM

## 2019-05-22 DIAGNOSIS — M19.019 SHOULDER ARTHRITIS: ICD-10-CM

## 2019-05-22 DIAGNOSIS — G89.29 CHRONIC PAIN OF LEFT KNEE: ICD-10-CM

## 2019-05-22 DIAGNOSIS — G89.4 CHRONIC PAIN SYNDROME: ICD-10-CM

## 2019-05-22 DIAGNOSIS — J41.8 MIXED SIMPLE AND MUCOPURULENT CHRONIC BRONCHITIS (HCC): Primary | ICD-10-CM

## 2019-05-22 DIAGNOSIS — M25.562 CHRONIC PAIN OF LEFT KNEE: ICD-10-CM

## 2019-05-22 DIAGNOSIS — M25.362 KNEE INSTABILITY, LEFT: ICD-10-CM

## 2019-05-22 PROCEDURE — 99214 OFFICE O/P EST MOD 30 MIN: CPT | Performed by: INTERNAL MEDICINE

## 2019-05-22 RX ORDER — TRAMADOL HYDROCHLORIDE 50 MG/1
50 TABLET ORAL 2 TIMES DAILY PRN
Qty: 60 TABLET | Refills: 0 | Status: SHIPPED | OUTPATIENT
Start: 2019-05-22 | End: 2019-07-11

## 2019-05-22 RX ORDER — ALBUTEROL SULFATE 90 UG/1
2 AEROSOL, METERED RESPIRATORY (INHALATION) EVERY 4 HOURS PRN
Qty: 1 INHALER | Refills: 5 | Status: SHIPPED | OUTPATIENT
Start: 2019-05-22
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
## 2019-05-30 DIAGNOSIS — G47.19 EXCESSIVE DAYTIME SLEEPINESS: Primary | ICD-10-CM

## 2019-05-30 DIAGNOSIS — Z00.8 PRE-SURGICAL PSYCHOLOGICAL ASSESSMENT, ENCOUNTER FOR: Primary | ICD-10-CM

## 2019-06-06 ENCOUNTER — HOSPITAL ENCOUNTER (OUTPATIENT)
Dept: SLEEP MEDICINE | Facility: HOSPITAL | Age: 64
Discharge: HOME | End: 2019-06-06
Admitting: INTERNAL MEDICINE

## 2019-06-06 VITALS
HEART RATE: 53 BPM | DIASTOLIC BLOOD PRESSURE: 65 MMHG | SYSTOLIC BLOOD PRESSURE: 102 MMHG | HEIGHT: 66 IN | BODY MASS INDEX: 17.84 KG/M2 | OXYGEN SATURATION: 93 % | WEIGHT: 111 LBS

## 2019-06-06 DIAGNOSIS — G47.19 EXCESSIVE DAYTIME SLEEPINESS: ICD-10-CM

## 2019-06-06 PROCEDURE — 95810 POLYSOM 6/> YRS 4/> PARAM: CPT | Performed by: INTERNAL MEDICINE

## 2019-06-06 PROCEDURE — 95810 POLYSOM 6/> YRS 4/> PARAM: CPT

## 2019-06-19 ENCOUNTER — TELEPHONE (OUTPATIENT)
Dept: PAIN MEDICINE | Facility: CLINIC | Age: 64
End: 2019-06-19

## 2019-06-20 ENCOUNTER — TELEPHONE (OUTPATIENT)
Dept: FAMILY MEDICINE CLINIC | Facility: CLINIC | Age: 64
End: 2019-06-20

## 2019-06-20 DIAGNOSIS — G47.33 OBSTRUCTIVE SLEEP APNEA: Primary | ICD-10-CM

## 2019-06-20 NOTE — TELEPHONE ENCOUNTER
----- Message from Sanjiv Best DO sent at 6/20/2019 11:57 AM EDT -----  Please let patient know his sleep study was consistent with obstructive sleep apnea.  I referred him to sleep medicine specialist for further recommendations on treatment.  He has any additional questions or concerns he can return his call.

## 2019-07-08 ENCOUNTER — TELEPHONE (OUTPATIENT)
Dept: PAIN MEDICINE | Facility: CLINIC | Age: 64
End: 2019-07-08

## 2019-07-11 ENCOUNTER — CONSULT (OUTPATIENT)
Dept: CARDIOLOGY | Facility: CLINIC | Age: 64
End: 2019-07-11

## 2019-07-11 VITALS
SYSTOLIC BLOOD PRESSURE: 81 MMHG | WEIGHT: 110.8 LBS | HEART RATE: 67 BPM | BODY MASS INDEX: 17.81 KG/M2 | HEIGHT: 66 IN | DIASTOLIC BLOOD PRESSURE: 63 MMHG

## 2019-07-11 DIAGNOSIS — G45.9 TIA (TRANSIENT ISCHEMIC ATTACK): ICD-10-CM

## 2019-07-11 DIAGNOSIS — Z72.0 TOBACCO ABUSE: ICD-10-CM

## 2019-07-11 DIAGNOSIS — I48.0 PAF (PAROXYSMAL ATRIAL FIBRILLATION) (HCC): Primary | ICD-10-CM

## 2019-07-11 PROCEDURE — 99214 OFFICE O/P EST MOD 30 MIN: CPT | Performed by: INTERNAL MEDICINE

## 2019-07-11 PROCEDURE — 93000 ELECTROCARDIOGRAM COMPLETE: CPT | Performed by: INTERNAL MEDICINE

## 2019-07-11 RX ORDER — ASPIRIN 81 MG/1
81 TABLET ORAL DAILY
Qty: 100 TABLET | Refills: 4
Start: 2019-07-11

## 2019-07-11 RX ORDER — ASPIRIN 325 MG
325 TABLET, DELAYED RELEASE (ENTERIC COATED) ORAL DAILY
COMMUNITY
End: 2019-07-11
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
## 2019-07-15 DIAGNOSIS — I48.0 PAROXYSMAL ATRIAL FIBRILLATION (HCC): ICD-10-CM

## 2019-07-15 DIAGNOSIS — I50.22 CHRONIC SYSTOLIC HEART FAILURE (HCC): ICD-10-CM

## 2019-07-15 RX ORDER — DILTIAZEM HYDROCHLORIDE 180 MG/1
180 CAPSULE, COATED, EXTENDED RELEASE ORAL
Qty: 30 CAPSULE | Refills: 1 | Status: ON HOLD | OUTPATIENT
Start: 2019-07-15 | End: 2019-09-04 | Stop reason: SDUPTHER

## 2019-07-15 RX ORDER — LISINOPRIL 2.5 MG/1
2.5 TABLET ORAL
Qty: 30 TABLET | Refills: 1 | Status: SHIPPED | OUTPATIENT
Start: 2019-07-15 | End: 2019-09-20 | Stop reason: SDUPTHER

## 2019-09-01 ENCOUNTER — APPOINTMENT (OUTPATIENT)
Dept: CT IMAGING | Facility: HOSPITAL | Age: 64
End: 2019-09-01

## 2019-09-01 ENCOUNTER — HOSPITAL ENCOUNTER (INPATIENT)
Facility: HOSPITAL | Age: 64
LOS: 3 days | Discharge: HOME | End: 2019-09-04
Attending: EMERGENCY MEDICINE | Admitting: INTERNAL MEDICINE

## 2019-09-01 ENCOUNTER — APPOINTMENT (OUTPATIENT)
Dept: GENERAL RADIOLOGY | Facility: HOSPITAL | Age: 64
End: 2019-09-01

## 2019-09-01 DIAGNOSIS — J44.1 COPD WITH ACUTE EXACERBATION (HCC): ICD-10-CM

## 2019-09-01 DIAGNOSIS — D72.829 LEUKOCYTOSIS, UNSPECIFIED TYPE: ICD-10-CM

## 2019-09-01 DIAGNOSIS — J44.1 COPD EXACERBATION (HCC): Primary | ICD-10-CM

## 2019-09-01 DIAGNOSIS — I48.0 PAROXYSMAL ATRIAL FIBRILLATION (HCC): ICD-10-CM

## 2019-09-01 DIAGNOSIS — Z74.09 IMPAIRED MOBILITY AND ADLS: ICD-10-CM

## 2019-09-01 DIAGNOSIS — I50.812 CHRONIC SYSTOLIC RIGHT HEART FAILURE (HCC): ICD-10-CM

## 2019-09-01 DIAGNOSIS — J96.21 ACUTE ON CHRONIC RESPIRATORY FAILURE WITH HYPOXIA (HCC): ICD-10-CM

## 2019-09-01 DIAGNOSIS — J18.9 COMMUNITY ACQUIRED PNEUMONIA, UNSPECIFIED LATERALITY: ICD-10-CM

## 2019-09-01 DIAGNOSIS — Z78.9 IMPAIRED MOBILITY AND ADLS: ICD-10-CM

## 2019-09-01 DIAGNOSIS — G81.94 HEMIPARESIS, LEFT (HCC): ICD-10-CM

## 2019-09-01 PROBLEM — F41.9 ANXIETY DISORDER: Status: ACTIVE | Noted: 2019-09-01

## 2019-09-01 PROBLEM — G40.909 SEIZURE DISORDER (HCC): Status: ACTIVE | Noted: 2019-09-01

## 2019-09-01 PROBLEM — Z85.51 HISTORY OF BLADDER CANCER: Status: ACTIVE | Noted: 2019-09-01

## 2019-09-01 PROBLEM — I10 HTN (HYPERTENSION): Status: ACTIVE | Noted: 2019-09-01

## 2019-09-01 LAB
ALBUMIN SERPL-MCNC: 4.4 G/DL (ref 3.5–5.2)
ALBUMIN/GLOB SERPL: 1.4 G/DL
ALP SERPL-CCNC: 88 U/L (ref 39–117)
ALT SERPL W P-5'-P-CCNC: 6 U/L (ref 1–41)
ANION GAP SERPL CALCULATED.3IONS-SCNC: 14 MMOL/L (ref 5–15)
AST SERPL-CCNC: 14 U/L (ref 1–40)
BASOPHILS # BLD AUTO: 0.05 10*3/MM3 (ref 0–0.2)
BASOPHILS NFR BLD AUTO: 0.3 % (ref 0–1.5)
BILIRUB SERPL-MCNC: 0.7 MG/DL (ref 0.2–1.2)
BILIRUB UR QL STRIP: NEGATIVE
BUN BLD-MCNC: 14 MG/DL (ref 8–23)
BUN/CREAT SERPL: 16.1 (ref 7–25)
CALCIUM SPEC-SCNC: 9.6 MG/DL (ref 8.6–10.5)
CHLORIDE SERPL-SCNC: 94 MMOL/L (ref 98–107)
CLARITY UR: CLEAR
CO2 SERPL-SCNC: 24 MMOL/L (ref 22–29)
COLOR UR: YELLOW
CREAT BLD-MCNC: 0.87 MG/DL (ref 0.76–1.27)
D DIMER PPP FEU-MCNC: 1.48 MCGFEU/ML (ref 0–0.56)
D-LACTATE SERPL-SCNC: 0.9 MMOL/L (ref 0.5–2)
DEPRECATED RDW RBC AUTO: 47.4 FL (ref 37–54)
EOSINOPHIL # BLD AUTO: 0.08 10*3/MM3 (ref 0–0.4)
EOSINOPHIL NFR BLD AUTO: 0.5 % (ref 0.3–6.2)
ERYTHROCYTE [DISTWIDTH] IN BLOOD BY AUTOMATED COUNT: 15.4 % (ref 12.3–15.4)
GFR SERPL CREATININE-BSD FRML MDRD: 88 ML/MIN/1.73
GLOBULIN UR ELPH-MCNC: 3.1 GM/DL
GLUCOSE BLD-MCNC: 157 MG/DL (ref 65–99)
GLUCOSE BLDC GLUCOMTR-MCNC: 189 MG/DL (ref 70–130)
GLUCOSE BLDC GLUCOMTR-MCNC: 195 MG/DL (ref 70–130)
GLUCOSE BLDC GLUCOMTR-MCNC: 197 MG/DL (ref 70–130)
GLUCOSE BLDC GLUCOMTR-MCNC: 229 MG/DL (ref 70–130)
GLUCOSE UR STRIP-MCNC: NEGATIVE MG/DL
HCT VFR BLD AUTO: 43.9 % (ref 37.5–51)
HGB BLD-MCNC: 13.9 G/DL (ref 13–17.7)
HGB UR QL STRIP.AUTO: NEGATIVE
HOLD SPECIMEN: NORMAL
HOLD SPECIMEN: NORMAL
IMM GRANULOCYTES # BLD AUTO: 0.05 10*3/MM3 (ref 0–0.05)
IMM GRANULOCYTES NFR BLD AUTO: 0.3 % (ref 0–0.5)
KETONES UR QL STRIP: NEGATIVE
L PNEUMO1 AG UR QL IA: NEGATIVE
LEUKOCYTE ESTERASE UR QL STRIP.AUTO: NEGATIVE
LYMPHOCYTES # BLD AUTO: 1.33 10*3/MM3 (ref 0.7–3.1)
LYMPHOCYTES NFR BLD AUTO: 7.8 % (ref 19.6–45.3)
MCH RBC QN AUTO: 27 PG (ref 26.6–33)
MCHC RBC AUTO-ENTMCNC: 31.7 G/DL (ref 31.5–35.7)
MCV RBC AUTO: 85.2 FL (ref 79–97)
MONOCYTES # BLD AUTO: 1.67 10*3/MM3 (ref 0.1–0.9)
MONOCYTES NFR BLD AUTO: 9.8 % (ref 5–12)
NEUTROPHILS # BLD AUTO: 13.9 10*3/MM3 (ref 1.7–7)
NEUTROPHILS NFR BLD AUTO: 81.3 % (ref 42.7–76)
NITRITE UR QL STRIP: NEGATIVE
NRBC BLD AUTO-RTO: 0 /100 WBC (ref 0–0.2)
NT-PROBNP SERPL-MCNC: 160.4 PG/ML (ref 5–900)
PH UR STRIP.AUTO: 6.5 [PH] (ref 5–8)
PLATELET # BLD AUTO: 374 10*3/MM3 (ref 140–450)
PMV BLD AUTO: 9.6 FL (ref 6–12)
POTASSIUM BLD-SCNC: 4.4 MMOL/L (ref 3.5–5.2)
PROT SERPL-MCNC: 7.5 G/DL (ref 6–8.5)
PROT UR QL STRIP: NEGATIVE
RBC # BLD AUTO: 5.15 10*6/MM3 (ref 4.14–5.8)
S PNEUM AG SPEC QL LA: NEGATIVE
SODIUM BLD-SCNC: 132 MMOL/L (ref 136–145)
SP GR UR STRIP: 1.07 (ref 1–1.03)
TROPONIN T SERPL-MCNC: <0.01 NG/ML (ref 0–0.03)
UROBILINOGEN UR QL STRIP: ABNORMAL
WBC NRBC COR # BLD: 17.08 10*3/MM3 (ref 3.4–10.8)
WHOLE BLOOD HOLD SPECIMEN: NORMAL
WHOLE BLOOD HOLD SPECIMEN: NORMAL

## 2019-09-01 PROCEDURE — 87040 BLOOD CULTURE FOR BACTERIA: CPT | Performed by: PHYSICIAN ASSISTANT

## 2019-09-01 PROCEDURE — 94640 AIRWAY INHALATION TREATMENT: CPT

## 2019-09-01 PROCEDURE — 87899 AGENT NOS ASSAY W/OPTIC: CPT | Performed by: PHYSICIAN ASSISTANT

## 2019-09-01 PROCEDURE — 83880 ASSAY OF NATRIURETIC PEPTIDE: CPT | Performed by: EMERGENCY MEDICINE

## 2019-09-01 PROCEDURE — 71045 X-RAY EXAM CHEST 1 VIEW: CPT

## 2019-09-01 PROCEDURE — 25010000002 METHYLPREDNISOLONE PER 125 MG: Performed by: EMERGENCY MEDICINE

## 2019-09-01 PROCEDURE — 25010000002 AZITHROMYCIN PER 500 MG: Performed by: EMERGENCY MEDICINE

## 2019-09-01 PROCEDURE — 94799 UNLISTED PULMONARY SVC/PX: CPT

## 2019-09-01 PROCEDURE — 85379 FIBRIN DEGRADATION QUANT: CPT | Performed by: EMERGENCY MEDICINE

## 2019-09-01 PROCEDURE — 87040 BLOOD CULTURE FOR BACTERIA: CPT | Performed by: EMERGENCY MEDICINE

## 2019-09-01 PROCEDURE — 93005 ELECTROCARDIOGRAM TRACING: CPT | Performed by: EMERGENCY MEDICINE

## 2019-09-01 PROCEDURE — 80053 COMPREHEN METABOLIC PANEL: CPT | Performed by: EMERGENCY MEDICINE

## 2019-09-01 PROCEDURE — 71275 CT ANGIOGRAPHY CHEST: CPT

## 2019-09-01 PROCEDURE — 63710000001 INSULIN LISPRO (HUMAN) PER 5 UNITS: Performed by: INTERNAL MEDICINE

## 2019-09-01 PROCEDURE — 81003 URINALYSIS AUTO W/O SCOPE: CPT | Performed by: INTERNAL MEDICINE

## 2019-09-01 PROCEDURE — 0 IOPAMIDOL PER 1 ML: Performed by: EMERGENCY MEDICINE

## 2019-09-01 PROCEDURE — 85025 COMPLETE CBC W/AUTO DIFF WBC: CPT | Performed by: EMERGENCY MEDICINE

## 2019-09-01 PROCEDURE — 99285 EMERGENCY DEPT VISIT HI MDM: CPT

## 2019-09-01 PROCEDURE — 93005 ELECTROCARDIOGRAM TRACING: CPT | Performed by: INTERNAL MEDICINE

## 2019-09-01 PROCEDURE — 84484 ASSAY OF TROPONIN QUANT: CPT | Performed by: EMERGENCY MEDICINE

## 2019-09-01 PROCEDURE — 82962 GLUCOSE BLOOD TEST: CPT

## 2019-09-01 PROCEDURE — 25010000002 METHYLPREDNISOLONE PER 125 MG: Performed by: PHYSICIAN ASSISTANT

## 2019-09-01 PROCEDURE — 83605 ASSAY OF LACTIC ACID: CPT | Performed by: EMERGENCY MEDICINE

## 2019-09-01 PROCEDURE — 25010000002 CEFTRIAXONE PER 250 MG: Performed by: EMERGENCY MEDICINE

## 2019-09-01 PROCEDURE — 93010 ELECTROCARDIOGRAM REPORT: CPT | Performed by: INTERNAL MEDICINE

## 2019-09-01 PROCEDURE — 99223 1ST HOSP IP/OBS HIGH 75: CPT | Performed by: INTERNAL MEDICINE

## 2019-09-01 RX ORDER — ESCITALOPRAM OXALATE 10 MG/1
10 TABLET ORAL DAILY
Status: DISCONTINUED | OUTPATIENT
Start: 2019-09-01 | End: 2019-09-04 | Stop reason: HOSPADM

## 2019-09-01 RX ORDER — HYDROXYZINE HYDROCHLORIDE 25 MG/1
50 TABLET, FILM COATED ORAL NIGHTLY PRN
Status: DISCONTINUED | OUTPATIENT
Start: 2019-09-01 | End: 2019-09-04 | Stop reason: HOSPADM

## 2019-09-01 RX ORDER — NICOTINE POLACRILEX 4 MG
15 LOZENGE BUCCAL
Status: DISCONTINUED | OUTPATIENT
Start: 2019-09-01 | End: 2019-09-04 | Stop reason: HOSPADM

## 2019-09-01 RX ORDER — SODIUM CHLORIDE 0.9 % (FLUSH) 0.9 %
10 SYRINGE (ML) INJECTION AS NEEDED
Status: DISCONTINUED | OUTPATIENT
Start: 2019-09-01 | End: 2019-09-04 | Stop reason: HOSPADM

## 2019-09-01 RX ORDER — OXYCODONE HYDROCHLORIDE AND ACETAMINOPHEN 5; 325 MG/1; MG/1
1 TABLET ORAL EVERY 6 HOURS PRN
Status: DISCONTINUED | OUTPATIENT
Start: 2019-09-01 | End: 2019-09-04 | Stop reason: HOSPADM

## 2019-09-01 RX ORDER — DILTIAZEM HYDROCHLORIDE 180 MG/1
180 CAPSULE, COATED, EXTENDED RELEASE ORAL
Status: DISCONTINUED | OUTPATIENT
Start: 2019-09-01 | End: 2019-09-04 | Stop reason: HOSPADM

## 2019-09-01 RX ORDER — METHYLPREDNISOLONE SODIUM SUCCINATE 125 MG/2ML
125 INJECTION, POWDER, LYOPHILIZED, FOR SOLUTION INTRAMUSCULAR; INTRAVENOUS ONCE
Status: COMPLETED | OUTPATIENT
Start: 2019-09-01 | End: 2019-09-01

## 2019-09-01 RX ORDER — IPRATROPIUM BROMIDE AND ALBUTEROL SULFATE 2.5; .5 MG/3ML; MG/3ML
3 SOLUTION RESPIRATORY (INHALATION) ONCE
Status: COMPLETED | OUTPATIENT
Start: 2019-09-01 | End: 2019-09-01

## 2019-09-01 RX ORDER — FAMOTIDINE 20 MG/1
20 TABLET, FILM COATED ORAL 2 TIMES DAILY
Status: DISCONTINUED | OUTPATIENT
Start: 2019-09-01 | End: 2019-09-04 | Stop reason: HOSPADM

## 2019-09-01 RX ORDER — DEXTROSE MONOHYDRATE 25 G/50ML
25 INJECTION, SOLUTION INTRAVENOUS
Status: DISCONTINUED | OUTPATIENT
Start: 2019-09-01 | End: 2019-09-04 | Stop reason: HOSPADM

## 2019-09-01 RX ORDER — METOPROLOL TARTRATE 5 MG/5ML
2.5 INJECTION INTRAVENOUS ONCE
Status: COMPLETED | OUTPATIENT
Start: 2019-09-01 | End: 2019-09-01

## 2019-09-01 RX ORDER — ASPIRIN 81 MG/1
81 TABLET ORAL DAILY
Status: DISCONTINUED | OUTPATIENT
Start: 2019-09-01 | End: 2019-09-04 | Stop reason: HOSPADM

## 2019-09-01 RX ORDER — METHYLPREDNISOLONE SODIUM SUCCINATE 125 MG/2ML
60 INJECTION, POWDER, LYOPHILIZED, FOR SOLUTION INTRAMUSCULAR; INTRAVENOUS EVERY 8 HOURS SCHEDULED
Status: COMPLETED | OUTPATIENT
Start: 2019-09-01 | End: 2019-09-02

## 2019-09-01 RX ORDER — IPRATROPIUM BROMIDE AND ALBUTEROL SULFATE 2.5; .5 MG/3ML; MG/3ML
3 SOLUTION RESPIRATORY (INHALATION)
Status: DISCONTINUED | OUTPATIENT
Start: 2019-09-01 | End: 2019-09-04 | Stop reason: HOSPADM

## 2019-09-01 RX ORDER — LISINOPRIL 5 MG/1
2.5 TABLET ORAL
Status: DISCONTINUED | OUTPATIENT
Start: 2019-09-01 | End: 2019-09-04 | Stop reason: HOSPADM

## 2019-09-01 RX ORDER — SODIUM CHLORIDE 0.9 % (FLUSH) 0.9 %
10 SYRINGE (ML) INJECTION EVERY 12 HOURS SCHEDULED
Status: DISCONTINUED | OUTPATIENT
Start: 2019-09-01 | End: 2019-09-04 | Stop reason: HOSPADM

## 2019-09-01 RX ORDER — NICOTINE 21 MG/24HR
1 PATCH, TRANSDERMAL 24 HOURS TRANSDERMAL EVERY 24 HOURS
Status: DISCONTINUED | OUTPATIENT
Start: 2019-09-01 | End: 2019-09-04 | Stop reason: HOSPADM

## 2019-09-01 RX ORDER — ACETAMINOPHEN 500 MG
500 TABLET ORAL EVERY 6 HOURS PRN
Status: DISCONTINUED | OUTPATIENT
Start: 2019-09-01 | End: 2019-09-04 | Stop reason: HOSPADM

## 2019-09-01 RX ADMIN — IPRATROPIUM BROMIDE AND ALBUTEROL SULFATE 3 ML: 2.5; .5 SOLUTION RESPIRATORY (INHALATION) at 19:22

## 2019-09-01 RX ADMIN — DILTIAZEM HYDROCHLORIDE 180 MG: 180 CAPSULE, COATED, EXTENDED RELEASE ORAL at 09:43

## 2019-09-01 RX ADMIN — CEFTRIAXONE 1 G: 1 INJECTION, POWDER, FOR SOLUTION INTRAMUSCULAR; INTRAVENOUS at 05:39

## 2019-09-01 RX ADMIN — FAMOTIDINE 20 MG: 20 TABLET ORAL at 20:34

## 2019-09-01 RX ADMIN — IPRATROPIUM BROMIDE AND ALBUTEROL SULFATE 3 ML: 2.5; .5 SOLUTION RESPIRATORY (INHALATION) at 23:45

## 2019-09-01 RX ADMIN — IOPAMIDOL 100 ML: 755 INJECTION, SOLUTION INTRAVENOUS at 03:38

## 2019-09-01 RX ADMIN — AZITHROMYCIN MONOHYDRATE 500 MG: 500 INJECTION, POWDER, LYOPHILIZED, FOR SOLUTION INTRAVENOUS at 06:15

## 2019-09-01 RX ADMIN — DOXYCYCLINE 100 MG: 100 INJECTION, POWDER, LYOPHILIZED, FOR SOLUTION INTRAVENOUS at 22:29

## 2019-09-01 RX ADMIN — ESCITALOPRAM OXALATE 10 MG: 10 TABLET ORAL at 09:44

## 2019-09-01 RX ADMIN — OXYCODONE HYDROCHLORIDE AND ACETAMINOPHEN 1 TABLET: 5; 325 TABLET ORAL at 20:41

## 2019-09-01 RX ADMIN — INSULIN LISPRO 3 UNITS: 100 INJECTION, SOLUTION INTRAVENOUS; SUBCUTANEOUS at 20:35

## 2019-09-01 RX ADMIN — METOPROLOL TARTRATE 2.5 MG: 5 INJECTION INTRAVENOUS at 21:55

## 2019-09-01 RX ADMIN — METHYLPREDNISOLONE SODIUM SUCCINATE 60 MG: 125 INJECTION, POWDER, FOR SOLUTION INTRAMUSCULAR; INTRAVENOUS at 16:49

## 2019-09-01 RX ADMIN — APIXABAN 5 MG: 5 TABLET, FILM COATED ORAL at 20:34

## 2019-09-01 RX ADMIN — DOXYCYCLINE 100 MG: 100 INJECTION, POWDER, LYOPHILIZED, FOR SOLUTION INTRAVENOUS at 11:51

## 2019-09-01 RX ADMIN — ASPIRIN 81 MG: 81 TABLET, COATED ORAL at 09:45

## 2019-09-01 RX ADMIN — SODIUM CHLORIDE, PRESERVATIVE FREE 10 ML: 5 INJECTION INTRAVENOUS at 09:47

## 2019-09-01 RX ADMIN — IPRATROPIUM BROMIDE AND ALBUTEROL SULFATE 3 ML: 2.5; .5 SOLUTION RESPIRATORY (INHALATION) at 11:59

## 2019-09-01 RX ADMIN — METHYLPREDNISOLONE SODIUM SUCCINATE 60 MG: 125 INJECTION, POWDER, FOR SOLUTION INTRAMUSCULAR; INTRAVENOUS at 09:45

## 2019-09-01 RX ADMIN — IPRATROPIUM BROMIDE AND ALBUTEROL SULFATE 3 ML: 2.5; .5 SOLUTION RESPIRATORY (INHALATION) at 15:51

## 2019-09-01 RX ADMIN — OXYCODONE HYDROCHLORIDE AND ACETAMINOPHEN 1 TABLET: 5; 325 TABLET ORAL at 06:14

## 2019-09-01 RX ADMIN — FAMOTIDINE 20 MG: 20 TABLET ORAL at 09:45

## 2019-09-01 RX ADMIN — IPRATROPIUM BROMIDE AND ALBUTEROL SULFATE 3 ML: 2.5; .5 SOLUTION RESPIRATORY (INHALATION) at 02:38

## 2019-09-01 RX ADMIN — OXYCODONE HYDROCHLORIDE AND ACETAMINOPHEN 1 TABLET: 5; 325 TABLET ORAL at 12:18

## 2019-09-01 RX ADMIN — LISINOPRIL 2.5 MG: 5 TABLET ORAL at 09:44

## 2019-09-01 RX ADMIN — APIXABAN 5 MG: 5 TABLET, FILM COATED ORAL at 09:45

## 2019-09-01 RX ADMIN — METHYLPREDNISOLONE SODIUM SUCCINATE 125 MG: 125 INJECTION, POWDER, FOR SOLUTION INTRAMUSCULAR; INTRAVENOUS at 02:25

## 2019-09-01 RX ADMIN — ACETAMINOPHEN 500 MG: 500 TABLET, FILM COATED ORAL at 09:45

## 2019-09-01 RX ADMIN — NICOTINE 1 PATCH: 21 PATCH, EXTENDED RELEASE TRANSDERMAL at 09:45

## 2019-09-01 RX ADMIN — HYDROXYZINE HYDROCHLORIDE 50 MG: 25 TABLET, FILM COATED ORAL at 20:41

## 2019-09-01 NOTE — PROGRESS NOTES
COPDe and PNA admitted this morning.    Continue steroids, abx, breathing treatment, wean O2 as possible.    Monitor for issues.
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
## 2019-09-01 NOTE — ED PROVIDER NOTES
Subjective   64-year-old male presents for evaluation of cough and shortness of breath.  Of note, the patient has a history of COPD for which she is oxygen dependent.  He wears 2 L of home oxygen at all times.  He states that over the past few days he has been experiencing progressively worsening shortness of breath when compared to baseline.  He endorses a chronic, nagging cough.  He states that he has had episodes of diaphoresis and subjective fever over the past 2 days as well.  No known sick contacts.        History provided by:  Patient  Shortness of Breath   Severity:  Moderate  Timing:  Constant  Chronicity:  New  Associated symptoms: cough, diaphoresis and fever        Review of Systems   Constitutional: Positive for diaphoresis and fever.   Respiratory: Positive for cough and shortness of breath.    All other systems reviewed and are negative.      Past Medical History:   Diagnosis Date   • A-fib (CMS/HCC)    • COPD (chronic obstructive pulmonary disease) (CMS/HCC)    • Hypertension    • Macular degeneration    • On home oxygen therapy    • Seizures (CMS/[facility])        Allergies   Allergen Reactions   • Lortab [Hydrocodone-Acetaminophen] Unknown (See Comments)     migraines   • Morphine Anxiety       Past Surgical History:   Procedure Laterality Date   • BRAIN SURGERY     • EYE SURGERY      cataract   • TRANSURETHRAL RESECTION OF BLADDER TUMOR N/A 3/15/2019    Procedure: CYSTOSCOPY TRANSURETHRAL RESECTION OF BLADDER TUMOR;  Surgeon: Anam Poe MD;  Location: [facility];  Service: Urology       Family History   Problem Relation Age of Onset   • COPD Mother    • Diabetes Father    • Hypertension Father    • Macular degeneration Father    • Hypertension Sister    • Thyroid disease Sister    • Stroke Sister    • Other Sister         back issues   • Depression Sister    • Leukemia Brother        Social History     Socioeconomic History   • Marital status:      Spouse name: Not on file   • Number of  children: Not on file   • Years of education: Not on file   • Highest education level: Not on file   Tobacco Use   • Smoking status: Heavy Tobacco Smoker     Packs/day: 1.00   • Smokeless tobacco: Never Used   Substance and Sexual Activity   • Alcohol use: No     Frequency: Never   • Drug use: No   • Sexual activity: Defer   Social History Narrative    Caffeine: 1-2 cups daily         Objective   Physical Exam   Constitutional: He is oriented to person, place, and time. He appears well-developed and well-nourished. No distress.   Nontoxic-appearing elderly male, disheveled   HENT:   Head: Normocephalic and atraumatic.   Mouth/Throat: Oropharynx is clear and moist.   Neck: Normal range of motion. No JVD present.   Cardiovascular: Regular rhythm and normal heart sounds. Exam reveals no gallop and no friction rub.   No murmur heard.  Tachycardic   Pulmonary/Chest: Breath sounds normal. He is in respiratory distress. He has no wheezes. He has no rales.   Mild respiratory distress, poor air movement noted bilaterally, mild expiratory wheezing noted bilaterally, speaking in full sentences, no retractions   Abdominal: Soft. Bowel sounds are normal. He exhibits no distension and no mass. There is no tenderness. There is no guarding.   Musculoskeletal: Normal range of motion.   Neurological: He is alert and oriented to person, place, and time.   Skin: Skin is warm and dry. No rash noted. He is not diaphoretic. No erythema. No pallor.   Psychiatric: He has a normal mood and affect. Judgment and thought content normal.   Nursing note and vitals reviewed.      Procedures         ED Course  ED Course as of Sep 01 0749   Sun Sep 01, 2019   0247 64-year-old male with a history of COPD, oxygen dependent, presents for evaluation of increased shortness of breath when compared to baseline over the past few days.  On arrival to the ED, patient nontoxic-appearing.  His initial oxygen saturations on his baseline 2 L were 86%.   Supplemental oxygen increased.  Nebs and steroids given for symptom relief.  [DD]   0256 Chest x-ray suggestive of mild CHF and severe COPD.  [DD]   0329 Low risk Well's and D dimer elevated.    [DD]   0329 Paged Dr. Trujillo, hospitalist. -DMD  [AT]   0329 Dr. Monge is at bedside re-evaluating the patient, updating him on lab/imaging results, and updating him on plan.   [AT]   0340 Discussed the case with Dr. Trujillo, hospitalist, who agrees to admit. -DMD  [AT]   0407 Chest CTA suggestive of pneumonia.  CAP coverage initiated.  Blood cultures pending.  [DD]      ED Course User Index  [AT] Arpita Simon  [DD] Saleem, Jewel Dickey MD       Recent Results (from the past 24 hour(s))   Comprehensive Metabolic Panel    Collection Time: 09/01/19  2:22 AM   Result Value Ref Range    Glucose 157 (H) 65 - 99 mg/dL    BUN 14 8 - 23 mg/dL    Creatinine 0.87 0.76 - 1.27 mg/dL    Sodium 132 (L) 136 - 145 mmol/L    Potassium 4.4 3.5 - 5.2 mmol/L    Chloride 94 (L) 98 - 107 mmol/L    CO2 24.0 22.0 - 29.0 mmol/L    Calcium 9.6 8.6 - 10.5 mg/dL    Total Protein 7.5 6.0 - 8.5 g/dL    Albumin 4.40 3.50 - 5.20 g/dL    ALT (SGPT) 6 1 - 41 U/L    AST (SGOT) 14 1 - 40 U/L    Alkaline Phosphatase 88 39 - 117 U/L    Total Bilirubin 0.7 0.2 - 1.2 mg/dL    eGFR Non African Amer 88 >60 mL/min/1.73    Globulin 3.1 gm/dL    A/G Ratio 1.4 g/dL    BUN/Creatinine Ratio 16.1 7.0 - 25.0    Anion Gap 14.0 5.0 - 15.0 mmol/L   BNP    Collection Time: 09/01/19  2:22 AM   Result Value Ref Range    proBNP 160.4 5.0 - 900.0 pg/mL   Troponin    Collection Time: 09/01/19  2:22 AM   Result Value Ref Range    Troponin T <0.010 0.000 - 0.030 ng/mL   Light Blue Top    Collection Time: 09/01/19  2:22 AM   Result Value Ref Range    Extra Tube hold for add-on    Green Top (Gel)    Collection Time: 09/01/19  2:22 AM   Result Value Ref Range    Extra Tube Hold for add-ons.    Lavender Top    Collection Time: 09/01/19  2:22 AM   Result Value Ref Range    Extra Tube hold  for add-on    Gold Top - SST    Collection Time: 09/01/19  2:22 AM   Result Value Ref Range    Extra Tube Hold for add-ons.    CBC Auto Differential    Collection Time: 09/01/19  2:22 AM   Result Value Ref Range    WBC 17.08 (H) 3.40 - 10.80 10*3/mm3    RBC 5.15 4.14 - 5.80 10*6/mm3    Hemoglobin 13.9 13.0 - 17.7 g/dL    Hematocrit 43.9 37.5 - 51.0 %    MCV 85.2 79.0 - 97.0 fL    MCH 27.0 26.6 - 33.0 pg    MCHC 31.7 31.5 - 35.7 g/dL    RDW 15.4 12.3 - 15.4 %    RDW-SD 47.4 37.0 - 54.0 fl    MPV 9.6 6.0 - 12.0 fL    Platelets 374 140 - 450 10*3/mm3    Neutrophil % 81.3 (H) 42.7 - 76.0 %    Lymphocyte % 7.8 (L) 19.6 - 45.3 %    Monocyte % 9.8 5.0 - 12.0 %    Eosinophil % 0.5 0.3 - 6.2 %    Basophil % 0.3 0.0 - 1.5 %    Immature Grans % 0.3 0.0 - 0.5 %    Neutrophils, Absolute 13.90 (H) 1.70 - 7.00 10*3/mm3    Lymphocytes, Absolute 1.33 0.70 - 3.10 10*3/mm3    Monocytes, Absolute 1.67 (H) 0.10 - 0.90 10*3/mm3    Eosinophils, Absolute 0.08 0.00 - 0.40 10*3/mm3    Basophils, Absolute 0.05 0.00 - 0.20 10*3/mm3    Immature Grans, Absolute 0.05 0.00 - 0.05 10*3/mm3    nRBC 0.0 0.0 - 0.2 /100 WBC   D-dimer, Quantitative    Collection Time: 09/01/19  2:22 AM   Result Value Ref Range    D-Dimer, Quantitative 1.48 (H) 0.00 - 0.56 MCGFEU/mL     Note: In addition to lab results from this visit, the labs listed above may include labs taken at another facility or during a different encounter within the last 24 hours. Please correlate lab times with ED admission and discharge times for further clarification of the services performed during this visit.    XR Chest 1 View   Final Result   Interval worsening in the appearance the chest. Mild increase in cardiac silhouette size and increase in vascular markings and interstitial markings. Findings likely due to mild congestive failure superimposed upon severe underlying chronic lung disease.   Follow-up recommended.      Signer Name: Karen Johansen MD    Signed: 9/1/2019 2:47 AM     "Workstation Name: [email or web address]     [facility]      CT Angiogram Chest With Contrast    (Results Pending)     Vitals:    09/01/19 0209 09/01/19 0217 09/01/19 0221 09/01/19 0238   BP:   117/78    BP Location:   Left arm    Patient Position:   Lying    Pulse: 118      Resp: 24   24   Temp: 98.6 °F (37 °C)      TempSrc: Oral      SpO2: (!) 89%      Weight:  49.9 kg (110 lb)     Height:  167.6 cm (66\")       Medications   sodium chloride 0.9 % flush 10 mL (not administered)   ipratropium-albuterol (DUO-NEB) nebulizer solution 3 mL (3 mL Nebulization Given 9/1/19 0238)   methylPREDNISolone sodium succinate (SOLU-Medrol) injection 125 mg (125 mg Intravenous Given 9/1/19 0225)   iopamidol (ISOVUE-370) 76 % injection 100 mL (100 mL Intravenous Given 9/1/19 0338)     ECG/EMG Results (last 24 hours)     Procedure Component Value Units Date/Time    ECG 12 Lead [[id number]] Collected:  09/01/19 0220     Updated:  09/01/19 0220        ECG 12 Lead                        Recent Results (from the past 24 hour(s))   Comprehensive Metabolic Panel    Collection Time: 09/01/19  2:22 AM   Result Value Ref Range    Glucose 157 (H) 65 - 99 mg/dL    BUN 14 8 - 23 mg/dL    Creatinine 0.87 0.76 - 1.27 mg/dL    Sodium 132 (L) 136 - 145 mmol/L    Potassium 4.4 3.5 - 5.2 mmol/L    Chloride 94 (L) 98 - 107 mmol/L    CO2 24.0 22.0 - 29.0 mmol/L    Calcium 9.6 8.6 - 10.5 mg/dL    Total Protein 7.5 6.0 - 8.5 g/dL    Albumin 4.40 3.50 - 5.20 g/dL    ALT (SGPT) 6 1 - 41 U/L    AST (SGOT) 14 1 - 40 U/L    Alkaline Phosphatase 88 39 - 117 U/L    Total Bilirubin 0.7 0.2 - 1.2 mg/dL    eGFR Non African Amer 88 >60 mL/min/1.73    Globulin 3.1 gm/dL    A/G Ratio 1.4 g/dL    BUN/Creatinine Ratio 16.1 7.0 - 25.0    Anion Gap 14.0 5.0 - 15.0 mmol/L   BNP    Collection Time: 09/01/19  2:22 AM   Result Value Ref Range    proBNP 160.4 5.0 - 900.0 pg/mL   Troponin    Collection Time: 09/01/19  2:22 AM   Result Value Ref Range    Troponin T " <0.010 0.000 - 0.030 ng/mL   Light Blue Top    Collection Time: 09/01/19  2:22 AM   Result Value Ref Range    Extra Tube hold for add-on    Green Top (Gel)    Collection Time: 09/01/19  2:22 AM   Result Value Ref Range    Extra Tube Hold for add-ons.    Lavender Top    Collection Time: 09/01/19  2:22 AM   Result Value Ref Range    Extra Tube hold for add-on    Gold Top - SST    Collection Time: 09/01/19  2:22 AM   Result Value Ref Range    Extra Tube Hold for add-ons.    CBC Auto Differential    Collection Time: 09/01/19  2:22 AM   Result Value Ref Range    WBC 17.08 (H) 3.40 - 10.80 10*3/mm3    RBC 5.15 4.14 - 5.80 10*6/mm3    Hemoglobin 13.9 13.0 - 17.7 g/dL    Hematocrit 43.9 37.5 - 51.0 %    MCV 85.2 79.0 - 97.0 fL    MCH 27.0 26.6 - 33.0 pg    MCHC 31.7 31.5 - 35.7 g/dL    RDW 15.4 12.3 - 15.4 %    RDW-SD 47.4 37.0 - 54.0 fl    MPV 9.6 6.0 - 12.0 fL    Platelets 374 140 - 450 10*3/mm3    Neutrophil % 81.3 (H) 42.7 - 76.0 %    Lymphocyte % 7.8 (L) 19.6 - 45.3 %    Monocyte % 9.8 5.0 - 12.0 %    Eosinophil % 0.5 0.3 - 6.2 %    Basophil % 0.3 0.0 - 1.5 %    Immature Grans % 0.3 0.0 - 0.5 %    Neutrophils, Absolute 13.90 (H) 1.70 - 7.00 10*3/mm3    Lymphocytes, Absolute 1.33 0.70 - 3.10 10*3/mm3    Monocytes, Absolute 1.67 (H) 0.10 - 0.90 10*3/mm3    Eosinophils, Absolute 0.08 0.00 - 0.40 10*3/mm3    Basophils, Absolute 0.05 0.00 - 0.20 10*3/mm3    Immature Grans, Absolute 0.05 0.00 - 0.05 10*3/mm3    nRBC 0.0 0.0 - 0.2 /100 WBC   D-dimer, Quantitative    Collection Time: 09/01/19  2:22 AM   Result Value Ref Range    D-Dimer, Quantitative 1.48 (H) 0.00 - 0.56 MCGFEU/mL   Lactic Acid, Plasma    Collection Time: 09/01/19  4:40 AM   Result Value Ref Range    Lactate 0.9 0.5 - 2.0 mmol/L     Note: In addition to lab results from this visit, the labs listed above may include labs taken at another facility or during a different encounter within the last 24 hours. Please correlate lab times with ED admission and discharge  "times for further clarification of the services performed during this visit.    CT Angiogram Chest With Contrast   Final Result      1. No evidence for pulmonary embolism.   2. Underlying chronic lung disease with emphysematous changes and areas of parenchymal scarring. Additionally redemonstrated is left-sided pleural thickening and pleural calcified plaque. There is particular biapical pulmonary parenchymal scarring.   3. Interval worsening in the abnormal thickening of the peribronchial vascular soft tissues especially lower lobes. This appears to be associated with bronchial wall thickening and there is new/worsening airspace disease at lung bases with air   bronchograms on the left. Findings are concerning for aspiration or pneumonia. Please correlate for clinical evidence of bronchitis. Follow-up to complete clearing is recommended. There is mild nonspecific mediastinal lymphadenopathy which is probably   reactive given the worsening airspace disease at lung bases.      Signer Name: Karen Johansen MD    Signed: 9/1/2019 3:54 AM    Workstation Name: [email or web address]     [facility]      XR Chest 1 View   Final Result   Interval worsening in the appearance the chest. Mild increase in cardiac silhouette size and increase in vascular markings and interstitial markings. Findings likely due to mild congestive failure superimposed upon severe underlying chronic lung disease.   Follow-up recommended.      Signer Name: Karen Johansen MD    Signed: 9/1/2019 2:47 AM    Workstation Name: [email or web address]     [facility]        Vitals:    09/01/19 0400 09/01/19 0445 09/01/19 0520 09/01/19 0525   BP: 121/78 138/80 126/76 138/88   BP Location:   Left arm Right arm   Patient Position:   Lying Lying   Pulse: 95 98 105 93   Resp:   18 18   Temp:   98.1 °F (36.7 °C)    TempSrc:   Oral    SpO2: 96% 96%     Weight:   53.4 kg (117 lb 12.8 oz)    Height:   167.6 cm (66\")      Medications   sodium " chloride 0.9 % flush 10 mL (not administered)   acetaminophen (TYLENOL) tablet 500 mg (500 mg Oral Not Given 9/1/19 0540)   ipratropium-albuterol (DUO-NEB) nebulizer solution 3 mL (not administered)   apixaban (ELIQUIS) tablet 5 mg (not administered)   aspirin EC tablet 81 mg (not administered)   diltiaZEM CD (CARDIZEM CD) 24 hr capsule 180 mg (not administered)   escitalopram (LEXAPRO) tablet 10 mg (not administered)   famotidine (PEPCID) tablet 20 mg (not administered)   hydrOXYzine (ATARAX) tablet 50 mg (not administered)   lisinopril (PRINIVIL,ZESTRIL) tablet 2.5 mg (not administered)   methylPREDNISolone sodium succinate (SOLU-Medrol) injection 60 mg (not administered)   doxycycline (VIBRAMYCIN) 100 mg/100 mL 0.9% NS MBP (not administered)   cefTRIAXone (ROCEPHIN) 1 g/100 mL 0.9% NS (MBP) (not administered)   sodium chloride 0.9 % flush 10 mL (not administered)   sodium chloride 0.9 % flush 10 mL (not administered)   nicotine (NICODERM CQ) 21 MG/24HR patch 1 patch (not administered)   oxyCODONE-acetaminophen (PERCOCET) 5-325 MG per tablet 1 tablet (1 tablet Oral Given 9/1/19 0614)   ipratropium-albuterol (DUO-NEB) nebulizer solution 3 mL (3 mL Nebulization Given 9/1/19 0238)   methylPREDNISolone sodium succinate (SOLU-Medrol) injection 125 mg (125 mg Intravenous Given 9/1/19 0225)   iopamidol (ISOVUE-370) 76 % injection 100 mL (100 mL Intravenous Given 9/1/19 0338)   cefTRIAXone (ROCEPHIN) 1 g/100 mL 0.9% NS (MBP) (1 g Intravenous New Bag 9/1/19 0539)   azithromycin 500 MG/250 ML 0.9% NS IVPB (MBP) (500 mg Intravenous New Bag 9/1/19 0615)     ECG/EMG Results (last 24 hours)     Procedure Component Value Units Date/Time    ECG 12 Lead [[id number]] Collected:  09/01/19 0220     Updated:  09/01/19 0220        ECG 12 Lead                   [facility]    Final diagnoses:   COPD exacerbation (CMS/[facility])   Leukocytosis, unspecified type   Community acquired pneumonia, unspecified laterality       Documentation assistance  provided by balwinder Simon.  Information recorded by the balwinder was done at my direction and has been verified and validated by me.     Arpita Simon  09/01/19 0221       Arpita Simon  09/01/19 0222       Arpita Simon  09/01/19 0338       Jewel Monge MD  09/01/19 2267

## 2019-09-02 PROBLEM — T38.0X5A STEROID-INDUCED HYPERGLYCEMIA: Status: ACTIVE | Noted: 2019-09-02

## 2019-09-02 PROBLEM — R73.9 STEROID-INDUCED HYPERGLYCEMIA: Status: ACTIVE | Noted: 2019-09-02

## 2019-09-02 PROBLEM — I50.812 CHRONIC SYSTOLIC RIGHT HEART FAILURE (HCC): Status: ACTIVE | Noted: 2019-03-07

## 2019-09-02 LAB
ANION GAP SERPL CALCULATED.3IONS-SCNC: 13 MMOL/L (ref 5–15)
BUN BLD-MCNC: 18 MG/DL (ref 8–23)
BUN/CREAT SERPL: 23.1 (ref 7–25)
CALCIUM SPEC-SCNC: 9.4 MG/DL (ref 8.6–10.5)
CHLORIDE SERPL-SCNC: 101 MMOL/L (ref 98–107)
CO2 SERPL-SCNC: 25 MMOL/L (ref 22–29)
CREAT BLD-MCNC: 0.78 MG/DL (ref 0.76–1.27)
DEPRECATED RDW RBC AUTO: 53.5 FL (ref 37–54)
ERYTHROCYTE [DISTWIDTH] IN BLOOD BY AUTOMATED COUNT: 15.6 % (ref 12.3–15.4)
GFR SERPL CREATININE-BSD FRML MDRD: 100 ML/MIN/1.73
GLUCOSE BLD-MCNC: 219 MG/DL (ref 65–99)
GLUCOSE BLDC GLUCOMTR-MCNC: 140 MG/DL (ref 70–130)
GLUCOSE BLDC GLUCOMTR-MCNC: 171 MG/DL (ref 70–130)
GLUCOSE BLDC GLUCOMTR-MCNC: 201 MG/DL (ref 70–130)
GLUCOSE BLDC GLUCOMTR-MCNC: 243 MG/DL (ref 70–130)
HCT VFR BLD AUTO: 42.5 % (ref 37.5–51)
HGB BLD-MCNC: 12.2 G/DL (ref 13–17.7)
MCH RBC QN AUTO: 26.9 PG (ref 26.6–33)
MCHC RBC AUTO-ENTMCNC: 28.7 G/DL (ref 31.5–35.7)
MCV RBC AUTO: 93.6 FL (ref 79–97)
PLATELET # BLD AUTO: 289 10*3/MM3 (ref 140–450)
PMV BLD AUTO: 9.6 FL (ref 6–12)
POTASSIUM BLD-SCNC: 3.5 MMOL/L (ref 3.5–5.2)
RBC # BLD AUTO: 4.54 10*6/MM3 (ref 4.14–5.8)
SODIUM BLD-SCNC: 139 MMOL/L (ref 136–145)
WBC NRBC COR # BLD: 12.32 10*3/MM3 (ref 3.4–10.8)

## 2019-09-02 PROCEDURE — 25010000002 CEFTRIAXONE PER 250 MG: Performed by: PHYSICIAN ASSISTANT

## 2019-09-02 PROCEDURE — 25010000002 METHYLPREDNISOLONE PER 125 MG: Performed by: PHYSICIAN ASSISTANT

## 2019-09-02 PROCEDURE — 63710000001 PREDNISONE PER 1 MG: Performed by: INTERNAL MEDICINE

## 2019-09-02 PROCEDURE — 94799 UNLISTED PULMONARY SVC/PX: CPT

## 2019-09-02 PROCEDURE — 85027 COMPLETE CBC AUTOMATED: CPT | Performed by: INTERNAL MEDICINE

## 2019-09-02 PROCEDURE — 80048 BASIC METABOLIC PNL TOTAL CA: CPT | Performed by: INTERNAL MEDICINE

## 2019-09-02 PROCEDURE — 99233 SBSQ HOSP IP/OBS HIGH 50: CPT | Performed by: INTERNAL MEDICINE

## 2019-09-02 PROCEDURE — 82962 GLUCOSE BLOOD TEST: CPT

## 2019-09-02 RX ORDER — PREDNISONE 20 MG/1
40 TABLET ORAL
Status: DISCONTINUED | OUTPATIENT
Start: 2019-09-02 | End: 2019-09-04 | Stop reason: HOSPADM

## 2019-09-02 RX ADMIN — APIXABAN 5 MG: 5 TABLET, FILM COATED ORAL at 22:31

## 2019-09-02 RX ADMIN — FAMOTIDINE 20 MG: 20 TABLET ORAL at 09:38

## 2019-09-02 RX ADMIN — ASPIRIN 81 MG: 81 TABLET, COATED ORAL at 09:38

## 2019-09-02 RX ADMIN — INSULIN LISPRO 3 UNITS: 100 INJECTION, SOLUTION INTRAVENOUS; SUBCUTANEOUS at 17:33

## 2019-09-02 RX ADMIN — DOXYCYCLINE 100 MG: 100 INJECTION, POWDER, LYOPHILIZED, FOR SOLUTION INTRAVENOUS at 09:54

## 2019-09-02 RX ADMIN — DILTIAZEM HYDROCHLORIDE 180 MG: 180 CAPSULE, COATED, EXTENDED RELEASE ORAL at 09:38

## 2019-09-02 RX ADMIN — OXYCODONE HYDROCHLORIDE AND ACETAMINOPHEN 1 TABLET: 5; 325 TABLET ORAL at 16:35

## 2019-09-02 RX ADMIN — IPRATROPIUM BROMIDE AND ALBUTEROL SULFATE 3 ML: 2.5; .5 SOLUTION RESPIRATORY (INHALATION) at 18:33

## 2019-09-02 RX ADMIN — INSULIN LISPRO 2 UNITS: 100 INJECTION, SOLUTION INTRAVENOUS; SUBCUTANEOUS at 22:32

## 2019-09-02 RX ADMIN — IPRATROPIUM BROMIDE AND ALBUTEROL SULFATE 3 ML: 2.5; .5 SOLUTION RESPIRATORY (INHALATION) at 15:41

## 2019-09-02 RX ADMIN — NICOTINE 1 PATCH: 21 PATCH, EXTENDED RELEASE TRANSDERMAL at 09:40

## 2019-09-02 RX ADMIN — ESCITALOPRAM OXALATE 10 MG: 10 TABLET ORAL at 09:38

## 2019-09-02 RX ADMIN — IPRATROPIUM BROMIDE AND ALBUTEROL SULFATE 3 ML: 2.5; .5 SOLUTION RESPIRATORY (INHALATION) at 07:18

## 2019-09-02 RX ADMIN — IPRATROPIUM BROMIDE AND ALBUTEROL SULFATE 3 ML: 2.5; .5 SOLUTION RESPIRATORY (INHALATION) at 02:37

## 2019-09-02 RX ADMIN — CEFTRIAXONE 1 G: 1 INJECTION, POWDER, FOR SOLUTION INTRAMUSCULAR; INTRAVENOUS at 07:47

## 2019-09-02 RX ADMIN — IPRATROPIUM BROMIDE AND ALBUTEROL SULFATE 3 ML: 2.5; .5 SOLUTION RESPIRATORY (INHALATION) at 22:57

## 2019-09-02 RX ADMIN — DOXYCYCLINE 100 MG: 100 INJECTION, POWDER, LYOPHILIZED, FOR SOLUTION INTRAVENOUS at 22:41

## 2019-09-02 RX ADMIN — APIXABAN 5 MG: 5 TABLET, FILM COATED ORAL at 09:38

## 2019-09-02 RX ADMIN — IPRATROPIUM BROMIDE AND ALBUTEROL SULFATE 3 ML: 2.5; .5 SOLUTION RESPIRATORY (INHALATION) at 11:59

## 2019-09-02 RX ADMIN — INSULIN LISPRO 3 UNITS: 100 INJECTION, SOLUTION INTRAVENOUS; SUBCUTANEOUS at 09:38

## 2019-09-02 RX ADMIN — SODIUM CHLORIDE, PRESERVATIVE FREE 10 ML: 5 INJECTION INTRAVENOUS at 09:39

## 2019-09-02 RX ADMIN — OXYCODONE HYDROCHLORIDE AND ACETAMINOPHEN 1 TABLET: 5; 325 TABLET ORAL at 09:38

## 2019-09-02 RX ADMIN — METHYLPREDNISOLONE SODIUM SUCCINATE 60 MG: 125 INJECTION, POWDER, FOR SOLUTION INTRAMUSCULAR; INTRAVENOUS at 01:05

## 2019-09-02 RX ADMIN — PREDNISONE 40 MG: 20 TABLET ORAL at 12:51

## 2019-09-02 RX ADMIN — FAMOTIDINE 20 MG: 20 TABLET ORAL at 22:32

## 2019-09-02 RX ADMIN — LISINOPRIL 2.5 MG: 5 TABLET ORAL at 09:38

## 2019-09-02 RX ADMIN — HYDROXYZINE HYDROCHLORIDE 50 MG: 25 TABLET, FILM COATED ORAL at 22:32

## 2019-09-02 NOTE — PLAN OF CARE
Problem: Patient Care Overview  Goal: Plan of Care Review   09/02/19 8635   OTHER   Outcome Summary Remains on 2L NC. Continues to complain of BLE pain, worse in knees, relieved by PO main meds. No arrhythmias on tele today. Pt feels his breathing has improved with steroids, nebs, and abx. Vitals stable, will cont to monitor.
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
## 2019-09-02 NOTE — CONSULTS
Referring Provider: DENNISE Sexton MD  Reason for Consultation: Smoking Cessation, AECOPD    Subjective .   Education:NN spoke with pt at BS.  Pt alert and able to answer questions appropriately.  Pt O2 sat   97% on  2L currently, home O2 use 2 L.Pt states no need of rescue inhaler.  Up to date on flu and PNA vaccines.  Pt reports that medications are not a problem for him to obtain or remember taking.  Pt has been admitted three times this past year for SOB/AECOPD.  Pt states that he stays in his recliner all day everyday and mobility is limited more by pain in his left knee than SOB.   Pt reports no previous hx of formal COPD teaching, no understanding of action plan, or OK.  COPD education completed in the form of explanation, handouts, and videos.  COPD and Smoking started at BS.  Stop light report, NN contact information, instructions for accessing [facility] and list of educational videos given to pt.  No new concerns or questions voiced at this time.  NN will continue to follow as needed.  Age: 64 y.o.  Sex: male  Smoker Status: active  Pulmonologist:NA  FEV1 (PFT): 39%  Home O2: 2L NC    Objective     SpO2 SpO2: 98 % (09/02/19 0718)  Device Device (Oxygen Therapy): nasal cannula (09/02/19 0718)  Flow Flow (L/min): 2 (09/02/19 0718)  Incentive Spirometer $ Incentive Spirometry: yes (09/02/19 0718)  IS Predicted Level (mL)     Number of Repetitions Number of Repetitions (IS): 10 (09/02/19 0718)   Level Incentive Spirometer (mL)    Patient Tolerance Patient Tolerance (IS): good (09/02/19 0718)   Inhaler Treatment Status    Treatment Route        Home Medications:  Medications Prior to Admission   Medication Sig Dispense Refill Last Dose   • acetaminophen (TYLENOL) 500 MG tablet Take 500 mg by mouth Every 6 (Six) Hours As Needed for Mild Pain . 2 tabs qam, then 1 tab q6hrs prn   Taking   • albuterol sulfate  (90 Base) MCG/ACT inhaler Inhale 2 puffs Every 4 (Four) Hours As Needed for Wheezing. 1 inhaler 5 Taking    • apixaban (ELIQUIS) 5 MG tablet tablet Take 1 tablet by mouth 2 (Two) Times a Day. 60 tablet 3 Taking   • aspirin 81 MG EC tablet Take 1 tablet by mouth Daily. 100 tablet 4    • CBD (cannabidiol) oral oil Take 1 mL by mouth Daily. Full spectrum 0.3%.   Taking   • diltiaZEM CD (CARDIZEM CD) 120 MG 24 hr capsule Take 1 capsule by mouth Daily. (Patient taking differently: Take 180 mg by mouth Daily.) 30 capsule 3 Taking   • diltiaZEM CD (CARDIZEM CD) 180 MG 24 hr capsule TAKE 1 CAPSULE BY MOUTH DAILY. 30 capsule 1    • escitalopram (LEXAPRO) 10 MG tablet Take 1 tablet by mouth Daily. 30 tablet 5 Taking   • famotidine (PEPCID) 20 MG tablet Take 1 tablet by mouth 2 (Two) Times a Day. 60 tablet 5 Taking   • hydrOXYzine (ATARAX) 50 MG tablet Take 1 tablet by mouth At Night As Needed for Itching. 30 tablet 5 Taking   • lisinopril (PRINIVIL,ZESTRIL) 2.5 MG tablet TAKE 1 TABLET BY MOUTH DAILY. 30 tablet 1    • oxyCODONE-acetaminophen (PERCOCET) 7.5-325 MG per tablet Take 1 tablet by mouth Every 6 (Six) Hours As Needed for Moderate Pain . 15 tablet 0 Taking   • umeclidinium-vilanterol (ANORO ELLIPTA) 62.5-25 MCG/INH aerosol powder  inhaler Inhale 1 puff Daily. 14 each 5 Taking       Discussion: Per current GOLD Standards, please consider: GOLD Stage 3, NRT on discharge, possible HH- PT/OT for exercise endurance if pt amicable as pt would not be able to meet current OP OR requirements, OP pulmonary referral    Discussed with primary RN, Marilyn Whitlock RN
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
## 2019-09-03 LAB
ANION GAP SERPL CALCULATED.3IONS-SCNC: 13 MMOL/L (ref 5–15)
BUN BLD-MCNC: 28 MG/DL (ref 8–23)
BUN/CREAT SERPL: 47.5 (ref 7–25)
CALCIUM SPEC-SCNC: 9.2 MG/DL (ref 8.6–10.5)
CHLORIDE SERPL-SCNC: 106 MMOL/L (ref 98–107)
CO2 SERPL-SCNC: 23 MMOL/L (ref 22–29)
CREAT BLD-MCNC: 0.59 MG/DL (ref 0.76–1.27)
DEPRECATED RDW RBC AUTO: 50.2 FL (ref 37–54)
ERYTHROCYTE [DISTWIDTH] IN BLOOD BY AUTOMATED COUNT: 15.6 % (ref 12.3–15.4)
GFR SERPL CREATININE-BSD FRML MDRD: 138 ML/MIN/1.73
GLUCOSE BLD-MCNC: 169 MG/DL (ref 65–99)
GLUCOSE BLDC GLUCOMTR-MCNC: 142 MG/DL (ref 70–130)
GLUCOSE BLDC GLUCOMTR-MCNC: 145 MG/DL (ref 70–130)
GLUCOSE BLDC GLUCOMTR-MCNC: 147 MG/DL (ref 70–130)
GLUCOSE BLDC GLUCOMTR-MCNC: 171 MG/DL (ref 70–130)
HCT VFR BLD AUTO: 36.5 % (ref 37.5–51)
HGB BLD-MCNC: 11.4 G/DL (ref 13–17.7)
MCH RBC QN AUTO: 27.4 PG (ref 26.6–33)
MCHC RBC AUTO-ENTMCNC: 31.2 G/DL (ref 31.5–35.7)
MCV RBC AUTO: 87.7 FL (ref 79–97)
PLATELET # BLD AUTO: 318 10*3/MM3 (ref 140–450)
PMV BLD AUTO: 9.9 FL (ref 6–12)
POTASSIUM BLD-SCNC: 3.7 MMOL/L (ref 3.5–5.2)
RBC # BLD AUTO: 4.16 10*6/MM3 (ref 4.14–5.8)
SODIUM BLD-SCNC: 142 MMOL/L (ref 136–145)
WBC NRBC COR # BLD: 15.61 10*3/MM3 (ref 3.4–10.8)

## 2019-09-03 PROCEDURE — 63710000001 PREDNISONE PER 1 MG: Performed by: INTERNAL MEDICINE

## 2019-09-03 PROCEDURE — 99232 SBSQ HOSP IP/OBS MODERATE 35: CPT | Performed by: INTERNAL MEDICINE

## 2019-09-03 PROCEDURE — 97162 PT EVAL MOD COMPLEX 30 MIN: CPT

## 2019-09-03 PROCEDURE — 82962 GLUCOSE BLOOD TEST: CPT

## 2019-09-03 PROCEDURE — 80048 BASIC METABOLIC PNL TOTAL CA: CPT | Performed by: INTERNAL MEDICINE

## 2019-09-03 PROCEDURE — 97530 THERAPEUTIC ACTIVITIES: CPT

## 2019-09-03 PROCEDURE — 94799 UNLISTED PULMONARY SVC/PX: CPT

## 2019-09-03 PROCEDURE — 25010000002 CEFTRIAXONE PER 250 MG: Performed by: PHYSICIAN ASSISTANT

## 2019-09-03 PROCEDURE — 97535 SELF CARE MNGMENT TRAINING: CPT

## 2019-09-03 PROCEDURE — 97110 THERAPEUTIC EXERCISES: CPT

## 2019-09-03 PROCEDURE — 85027 COMPLETE CBC AUTOMATED: CPT | Performed by: INTERNAL MEDICINE

## 2019-09-03 PROCEDURE — 97116 GAIT TRAINING THERAPY: CPT

## 2019-09-03 PROCEDURE — 97166 OT EVAL MOD COMPLEX 45 MIN: CPT

## 2019-09-03 RX ADMIN — IPRATROPIUM BROMIDE AND ALBUTEROL SULFATE 3 ML: 2.5; .5 SOLUTION RESPIRATORY (INHALATION) at 23:21

## 2019-09-03 RX ADMIN — ESCITALOPRAM OXALATE 10 MG: 10 TABLET ORAL at 09:40

## 2019-09-03 RX ADMIN — FAMOTIDINE 20 MG: 20 TABLET ORAL at 09:40

## 2019-09-03 RX ADMIN — IPRATROPIUM BROMIDE AND ALBUTEROL SULFATE 3 ML: 2.5; .5 SOLUTION RESPIRATORY (INHALATION) at 07:30

## 2019-09-03 RX ADMIN — LISINOPRIL 2.5 MG: 5 TABLET ORAL at 09:40

## 2019-09-03 RX ADMIN — FAMOTIDINE 20 MG: 20 TABLET ORAL at 22:03

## 2019-09-03 RX ADMIN — IPRATROPIUM BROMIDE AND ALBUTEROL SULFATE 3 ML: 2.5; .5 SOLUTION RESPIRATORY (INHALATION) at 20:13

## 2019-09-03 RX ADMIN — IPRATROPIUM BROMIDE AND ALBUTEROL SULFATE 3 ML: 2.5; .5 SOLUTION RESPIRATORY (INHALATION) at 15:34

## 2019-09-03 RX ADMIN — OXYCODONE HYDROCHLORIDE AND ACETAMINOPHEN 1 TABLET: 5; 325 TABLET ORAL at 04:42

## 2019-09-03 RX ADMIN — IPRATROPIUM BROMIDE AND ALBUTEROL SULFATE 3 ML: 2.5; .5 SOLUTION RESPIRATORY (INHALATION) at 02:58

## 2019-09-03 RX ADMIN — OXYCODONE HYDROCHLORIDE AND ACETAMINOPHEN 1 TABLET: 5; 325 TABLET ORAL at 22:04

## 2019-09-03 RX ADMIN — OXYCODONE HYDROCHLORIDE AND ACETAMINOPHEN 1 TABLET: 5; 325 TABLET ORAL at 12:51

## 2019-09-03 RX ADMIN — PREDNISONE 40 MG: 20 TABLET ORAL at 09:40

## 2019-09-03 RX ADMIN — NICOTINE 1 PATCH: 21 PATCH, EXTENDED RELEASE TRANSDERMAL at 09:42

## 2019-09-03 RX ADMIN — APIXABAN 5 MG: 5 TABLET, FILM COATED ORAL at 22:04

## 2019-09-03 RX ADMIN — ASPIRIN 81 MG: 81 TABLET, COATED ORAL at 09:40

## 2019-09-03 RX ADMIN — SODIUM CHLORIDE, PRESERVATIVE FREE 10 ML: 5 INJECTION INTRAVENOUS at 09:41

## 2019-09-03 RX ADMIN — CEFTRIAXONE 1 G: 1 INJECTION, POWDER, FOR SOLUTION INTRAMUSCULAR; INTRAVENOUS at 04:58

## 2019-09-03 RX ADMIN — DOXYCYCLINE 100 MG: 100 INJECTION, POWDER, LYOPHILIZED, FOR SOLUTION INTRAVENOUS at 22:04

## 2019-09-03 RX ADMIN — DILTIAZEM HYDROCHLORIDE 180 MG: 180 CAPSULE, COATED, EXTENDED RELEASE ORAL at 09:40

## 2019-09-03 RX ADMIN — APIXABAN 5 MG: 5 TABLET, FILM COATED ORAL at 09:40

## 2019-09-03 RX ADMIN — DOXYCYCLINE 100 MG: 100 INJECTION, POWDER, LYOPHILIZED, FOR SOLUTION INTRAVENOUS at 09:41

## 2019-09-03 RX ADMIN — HYDROXYZINE HYDROCHLORIDE 50 MG: 25 TABLET, FILM COATED ORAL at 22:04

## 2019-09-03 RX ADMIN — IPRATROPIUM BROMIDE AND ALBUTEROL SULFATE 3 ML: 2.5; .5 SOLUTION RESPIRATORY (INHALATION) at 10:52

## 2019-09-03 NOTE — PLAN OF CARE
Problem: Patient Care Overview  Goal: Plan of Care Review  Outcome: Ongoing (interventions implemented as appropriate)   09/03/19 9570   OTHER   Outcome Summary Pt alert and oriented, anxious at times, 2L NC baseline, UWAx1 to the BSC, Tele- NSR, vital signs stable, daily wts, strict I&Os, waiting for PT/OT to see patient. Will continue to monitor.    Coping/Psychosocial   Plan of Care Reviewed With patient

## 2019-09-03 NOTE — PLAN OF CARE
Problem: Patient Care Overview  Goal: Plan of Care Review  Outcome: Ongoing (interventions implemented as appropriate)   09/03/19 8137   OTHER   Outcome Summary Pt needs assist for L hemiparesis when ambulating. Pt needs L AFO for drop foot correction and safety with ambulating. Continue PT per goals.   Coping/Psychosocial   Plan of Care Reviewed With patient;family   Plan of Care Review   Progress improving

## 2019-09-03 NOTE — CONSULTS
COPD NN spoke with pt at BS.  Pt alert and able to answer questions appropriately.  Pt is able to verbalize the COPD action plan and smoking in the role of his disease process.  Pt  had previously refused PT/OT services stating he was too weak to participate.  Need for exercise to stay healthy reiterated, pt agreed to work with them today for assessment.  No new needs or concerns voiced at this time.  NN will continue to follow.

## 2019-09-03 NOTE — PLAN OF CARE
Problem: Patient Care Overview  Goal: Plan of Care Review   09/03/19 8864   OTHER   Outcome Summary OT IE completed. No POC established as pt declines ongoing OT intervention. Pt alert, Ox3 and agreeable to education for energy conservation, activity modification and use of AE to maximize ADL independence. Handout provided for EC/WS and AE issued with teaching completed. Pt requests BSC for home. Denies any additional needs. OT to d/c at this time per pt/family request.    Coping/Psychosocial   Plan of Care Reviewed With patient
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
## 2019-09-03 NOTE — PROGRESS NOTES
Continued Stay Note  [facility]     Patient Name: Luther Martines  MRN: [id number]  Today's Date: 9/3/2019    Admit Date: 9/1/2019    Discharge Plan     Row Name 09/03/19 1140       Plan    Plan  home    Patient/Family in Agreement with Plan  yes    Plan Comments  Mr. Martines is requesting a wheelchair and bedside commode for home use. I contacted Maikol with [facility] regarding this. Orders were faxed to him.Mr. Martines's insurance is going to approve a wheelchair for home use. Maikol is going to deliver this with the bedside commode to the bedside today.        Discharge Codes    No documentation.             Rafael Kincaid RN

## 2019-09-03 NOTE — THERAPY EVALUATION
Patient Name: Luther Martines  : 1955    MRN: [id number]                              Today's Date: 9/3/2019       Admit Date: 2019    Visit Dx:     ICD-10-CM ICD-9-CM   1. COPD exacerbation (CMS/[facility]) J44.1 491.21   2. Leukocytosis, unspecified type D72.829 288.60   3. Community acquired pneumonia, unspecified laterality J18.9 486   4. Impaired mobility and ADLs Z74.09 799.89   5. Hemiparesis, left (CMS/[facility]) G81.94 342.90   6. Chronic systolic right heart failure (CMS/[facility]) I50.22 428.22   7. COPD with acute exacerbation (CMS/[facility]) J44.1 491.21   8. Acute on chronic respiratory failure with hypoxia (CMS/[facility]) J96.21 518.84     799.02     Patient Active Problem List   Diagnosis   • Pneumonia due to infectious organism   • Tobacco abuse   • Hematuria   • Atrial fibrillation with RVR (CMS/[facility])   • Chronic systolic right heart failure (CMS/[facility])   • Bladder mass   • COPD with acute exacerbation (CMS/[facility])   • Suspected LETHA (obstructive sleep apnea)   • TIA (transient ischemic attack)   • Acute on chronic respiratory failure with hypoxia (CMS/[facility])   • Bilateral pneumonia   • HTN (hypertension)   • History of bladder cancer   • Seizure disorder (CMS/[facility])   • Anxiety disorder   • COPD exacerbation (CMS/[facility])   • Steroid-induced hyperglycemia     Past Medical History:   Diagnosis Date   • A-fib (CMS/[facility])    • COPD (chronic obstructive pulmonary disease) (CMS/[facility])    • Hypertension    • Macular degeneration    • On home oxygen therapy    • Seizures (CMS/[facility])      Past Surgical History:   Procedure Laterality Date   • BRAIN SURGERY     • EYE SURGERY      cataract   • TRANSURETHRAL RESECTION OF BLADDER TUMOR N/A 3/15/2019    Procedure: CYSTOSCOPY TRANSURETHRAL RESECTION OF BLADDER TUMOR;  Surgeon: Anam Poe MD;  Location: [facility];  Service: Urology     General Information     Row Name 19 1239          PT Evaluation Time/Intention    Document Type  evaluation  -BD     Mode of Treatment  physical  therapy;individual therapy  -BD     Row Name 09/03/19 1239          General Information    Patient Profile Reviewed?  yes  -BD     Prior Level of Function  independent:;all household mobility;min assist:;ADL's;dependent:;driving  -BD     Existing Precautions/Restrictions  fall;oxygen therapy device and L/min  -BD     Barriers to Rehab  medically complex;previous functional deficit  -BD     Row Name 09/03/19 1239          Relationship/Environment    Lives With  child(fredis), dependent;spouse  -BD     Row Name 09/03/19 1239          Resource/Environmental Concerns    Current Living Arrangements  home/apartment/condo  -BD     Row Name 09/03/19 1239          Home Main Entrance    Number of Stairs, Main Entrance  two  -BD     Stair Railings, Main Entrance  other (see comments) not stated  -BD     Row Name 09/03/19 1239          Stairs Within Home, Primary    Number of Stairs, Within Home, Primary  none  -BD     Row Name 09/03/19 1239          Cognitive Assessment/Intervention- PT/OT    Orientation Status (Cognition)  oriented x 4  -BD     Cognitive Assessment/Intervention Comment  Pt decided to work with PT later in morning.  -BD     Row Name 09/03/19 1239          Safety Issues, Functional Mobility    Safety Issues Affecting Function (Mobility)  awareness of need for assistance;insight into deficits/self awareness  -BD     Impairments Affecting Function (Mobility)  endurance/activity tolerance;range of motion (ROM);shortness of breath;strength;visual/perceptual  -BD       User Key  (r) = Recorded By, (t) = Taken By, (c) = Cosigned By    Initials Name Provider Type    BD Ivone Arevalo, PT Physical Therapist        Mobility     Row Name 09/03/19 1243          Bed Mobility Assessment/Treatment    Bed Mobility Assessment/Treatment  supine-sit  -BD     Supine-Sit [place] (Bed Mobility)  supervision;set up  -BD     Assistive Device (Bed Mobility)  bed rails  -BD     Row Name 09/03/19 1243          Transfer  Assessment/Treatment    Comment (Transfers)  Pt cooperated with transfer to stand and to ambulate with PT.  -BD     Row Name 09/03/19 1243          Sit-Stand Transfer    Sit-Stand [place] (Transfers)  contact guard;set up;1 person assist;1 person to manage equipment  -BD     Assistive Device (Sit-Stand Transfers)  -- Hand held assist, gt belt. Pt has L hemiparesis  -BD     Row Name 09/03/19 1243          Gait/Stairs Assessment/Training    85019 - Gait Training Minutes   15  -BD     Gait/Stairs Assessment/Training  gait/ambulation independence;distance ambulated;gait deviations  -BD     [place] Level (Gait)  minimum assist (75% patient effort);1 person assist;1 person to manage equipment;set up  -BD     Assistive Device (Gait)  other (see comments) Pt held IV pole with R hand. 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 Pt has L foot , drop foot with external hip rotation. Pt does not use AFO, but would greatly benefit from having L AFO for drop foot.  -BD     Row Name 09/03/19 1243          Mobility Assessment/Intervention    Extremity Weight-bearing Status  left upper extremity  -BD     Left Upper Extremity (Weight-bearing Status)  non weight-bearing (NWB)  -BD       User Key  (r) = Recorded By, (t) = Taken By, (c) = Cosigned By    Initials Name Provider Type    BD Ivone Arevalo, PT Physical Therapist        Obj/Interventions     Row Name 09/03/19 1252          General ROM    GENERAL ROM COMMENTS  L UE hemipresis. L LE hemiparesis with decreased ankle DF, hip  external rotation.   -     Row Name 09/03/19 1252          MMT (Manual Muscle Testing)    General MMT Comments  R LE WFL. L LE decreased strength, grossly L LE 4-/5  -BD     Row Name 09/03/19 1252          Therapeutic Exercise    Upper Extremity Range of Motion (Therapeutic Exercise)  shoulder flexion/extension, bilateral  -BD     Lower Extremity (Therapeutic Exercise)  SAQ (short arc quad), bilateral;heel slides, bilateral;SLR (straight leg raise), right  -BD     Lower Extremity Range of Motion (Therapeutic Exercise)  ankle dorsiflexion/plantar flexion, right  -BD     Exercise Type (Therapeutic Exercise)  AROM (active range of motion);AAROM (active assistive range of motion)  -BD     Position (Therapeutic Exercise)  seated  -BD     Expected Outcome (Therapeutic Exercise)  facilitate normal movement patterns  -BD     Comment (Therapeutic Exercise)  L LE DF,PF ROM. Strong Clonus L ankle with PROM DF  -BD     Row Name 09/03/19 1252          Static Sitting Balance    Level of [place] (Unsupported Sitting, Static Balance)  independent  -BD     Sitting Position (Unsupported Sitting, Static Balance)  sitting on edge of bed  -BD     [facility] Name 09/03/19 1252          Dynamic Sitting Balance    Level of [place], Reaches Outside Midline (Sitting, Dynamic Balance)  independent  -BD     Sitting Position, Reaches Outside Midline (Sitting, Dynamic Balance)  sitting on edge of bed  -[facility] Name 09/03/19 1252          Static Standing Balance    Level of [place] (Supported Standing, Static Balance)  conditional independence  -BD     Assistive Device Utilized (Supported Standing, Static Balance)  cane, straight  -BD     Comment (Supported Standing, Static Balance)  Support for balance.  -     Row Name 09/03/19 1252          Dynamic Standing Balance    Level of [place], Reaches Outside Midline (Standing, Dynamic Balance)  contact guard assist  -     Assistive Device Utilized (Supported Standing, Dynamic  Balance)  cane, quad  -BD     Lower Extremity Device, Reaches Outside Midline (Standing, Dynamic Balance)  other (see comments) L foot AFO recommended for drop foot.  -BD       User Key  (r) = Recorded By, (t) = Taken By, (c) = Cosigned By    Initials Name Provider Type    BD Ivone Arevalo, PT Physical Therapist        Goals/Plan     Row Name 09/03/19 1307          Bed Mobility Goal 1 (PT)    Activity/Assistive Device (Bed Mobility Goal 1, PT)  bed mobility activities, all  -BD     [place] Level/Cues Needed (Bed Mobility Goal 1, PT)  independent  -BD     Time Frame (Bed Mobility Goal 1, PT)  long term goal (LTG);10 days  -BD     Progress/Outcomes (Bed Mobility Goal 1, PT)  goal ongoing  -BD     Row Name 09/03/19 1307          Transfer Goal 1 (PT)    Activity/Assistive Device (Transfer Goal 1, PT)  transfers, all  -BD     [place] Level/Cues Needed (Transfer Goal 1, PT)  conditional independence;set-up required  -BD     Time Frame (Transfer Goal 1, PT)  short term goal (STG);10 days  -BD     Progress/Outcome (Transfer Goal 1, PT)  goal ongoing  -BD     Row Name 09/03/19 1307          Gait Training Goal 1 (PT)    Activity/Assistive Device (Gait Training Goal 1, PT)  gait (walking locomotion);assistive device use;decrease asymmetrical patterns;decrease fall risk;cane, quad  -BD     [place] Level (Gait Training Goal 1, PT)  supervision required;1 person assist  -BD     Distance (Gait Goal 1, PT)  150  -BD     Time Frame (Gait Training Goal 1, PT)  long term goal (LTG);10 days  -BD     Barriers (Gait Training Goal 1, PT)  Needed L foot AFO  -BD     Progress/Outcome (Gait Training Goal 1, PT)  goal ongoing  -BD     Row Name 09/03/19 1307          Patient Education Goal (PT)    Activity (Patient Education Goal, PT)  Safety precautions for transfers and gait.  -BD     [place]/Cues/Accuracy (Memory Goal 2, PT)  demonstrates adequately  -BD     Time Frame (Patient Education Goal, PT)  long term goal  (LTG);10 days  -BD     Progress/Outcome (Patient Education Goal, PT)  goal ongoing  -BD       User Key  (r) = Recorded By, (t) = Taken By, (c) = Cosigned By    Initials Name Provider Type    BD Ivone Arevalo, PT Physical Therapist        Clinical Impression     Row Name 09/03/19 1258          Pain Assessment    Additional Documentation  Pain Scale: Numbers Pre/Post-Treatment (Group)  -BD     Row Name 09/03/19 1258          Pain Scale: Numbers Pre/Post-Treatment    Pain Location - Side  Bilateral  -BD     Pain Location - Orientation  lower  -BD     Pain Location  extremity  -BD     Pre/Post Treatment Pain Comment  Pt tolerated treatment without c/o pain  -BD     Pain Intervention(s)  Repositioned;Ambulation/increased activity  -BD     Row Name 09/03/19 1258          Plan of Care Review    Plan of Care Reviewed With  patient  -     Row Name 09/03/19 1258          Physical Therapy Clinical Impression    Patient/Family Goals Statement (PT Clinical Impression)  Pt wants to return home.  -BD     Criteria for Skilled Interventions Met (PT Clinical Impression)  yes;treatment indicated  -BD     Rehab Potential (PT Clinical Summary)  good, to achieve stated therapy goals  -BD     Row Name 09/03/19 1258          Vital Signs    Pre Systolic BP Rehab  117  -BD     Pre Treatment Diastolic BP  73  -BD     Intra Systolic BP Rehab  137  -BD     Intra Treatment Diastolic BP  79  -BD     Pretreatment Heart Rate (beats/min)  82  -BD     Pre SpO2 (%)  94  -BD     O2 Delivery Pre Treatment  supplemental O2  -BD     O2 Delivery Intra Treatment  supplemental O2  -BD     O2 Delivery Post Treatment  supplemental O2  -BD     Pre Patient Position  Supine  -BD     Intra Patient Position  Standing  -BD     Post Patient Position  Sitting  -BD     Row Name 09/03/19 1258          Positioning and Restraints    Pre-Treatment Position  in bed  -BD     Post Treatment Position  chair  -BD     In Chair  notified nsg;reclined;call light within  reach;encouraged to call for assist;exit alarm on;with family/caregiver;legs elevated;heels elevated  -       User Key  (r) = Recorded By, (t) = Taken By, (c) = Cosigned By    Initials Name Provider Type    Ivone Merino PT Physical Therapist        Outcome Measures     Row Name 09/03/19 0915          How much help from another person do you currently need...    Turning from your back to your side while in flat bed without using bedrails?  3  -BD     Moving from lying on back to sitting on the side of a flat bed without bedrails?  3  -BD     Moving to and from a bed to a chair (including a wheelchair)?  3  -BD     Standing up from a chair using your arms (e.g., wheelchair, bedside chair)?  3  -BD     Climbing 3-5 steps with a railing?  2  -BD     To walk in hospital room?  3  -BD     AM-PAC 6 Clicks Score (PT)  17  -     Row Name 09/03/19 0915          Functional Assessment    Outcome Measure Options  AM-PAC 6 Clicks Basic Mobility (PT)  -       User Key  (r) = Recorded By, (t) = Taken By, (c) = Cosigned By    Initials Name Provider Type    Ivone Merino PT Physical Therapist        Physical Therapy Education     Title: PT OT SLP Therapies (Not Started)     Topic: Physical Therapy (In Progress)     Point: Mobility training (In Progress)     Learning Progress Summary           Patient Acceptance, E,D, NR by MARTHA at 9/3/2019  1:12 PM   Family Acceptance, E,D, NR by MARTHA at 9/3/2019  1:12 PM                   Point: Home exercise program (In Progress)     Learning Progress Summary           Patient Acceptance, E,D, NR by MARTHA at 9/3/2019  1:12 PM   Family Acceptance, E,D, NR by MARTHA at 9/3/2019  1:12 PM                   Point: Body mechanics (In Progress)     Learning Progress Summary           Patient Acceptance, E,D, NR by MARTHA at 9/3/2019  1:12 PM   Family Acceptance, E,D, NR by MARTHA at 9/3/2019  1:12 PM                   Point: Precautions (In Progress)     Learning Progress Summary           Patient  Acceptance, E,D, NR by  at 9/3/2019  1:12 PM   Family Acceptance, E,D, NR by  at 9/3/2019  1:12 PM                               User Key     Initials Effective Dates Name Provider Type Discipline     06/08/18 -  Ivone Arevalo PT Physical Therapist PT              PT Recommendation and Plan  Planned Therapy Interventions (PT Eval): balance training, gait training, patient/family education  Outcome Summary/Treatment Plan (PT)  Anticipated Equipment Needs at Discharge (PT): other (see comments)(L foot AFO for Drop foot correction during gt.)  Anticipated Discharge Disposition (PT): home with assist  Plan of Care Reviewed With: patient  Progress: improving  Outcome Summary: Pt needs assist for L hemiparesis when ambulating. Pt needs L AFO for drop foot correction and safety with ambulating. Continue PT per goals.     Time Calculation:   PT Charges     Row Name 09/03/19 1316 09/03/19 1243          Time Calculation    Start Time  0915  -  --     PT Received On  09/03/19  -  --     PT Goal Re-Cert Due Date  09/13/19  -  --        Time Calculation- PT    Total Timed Code Minutes- PT  44 minute(s)  -  --        Timed Charges    21320 - PT Therapeutic Exercise Minutes  14  -  --     90782 - Gait Training Minutes   --  15  -     50955 - PT Therapeutic Activity Minutes  15  -  --       User Key  (r) = Recorded By, (t) = Taken By, (c) = Cosigned By    Initials Name Provider Type    BD Ivone Arevalo PT Physical Therapist        Therapy Charges for Today     Code Description Service Date Service Provider Modifiers Qty    70061565154 HC PT THER PROC EA 15 MIN 9/3/2019 Ivone Arevalo, PT GP 1    24779468662 HC GAIT TRAINING EA 15 MIN 9/3/2019 Ivone Arevalo, PT GP 1    64616238465 HC PT THERAPEUTIC ACT EA 15 MIN 9/3/2019 Ivone Arevalo, PT GP 1    36967367648 HC PT THER SUPP EA 15 MIN 9/3/2019 Ivone Arevalo, PT GP 2    21959700265 HC PT EVAL MOD COMPLEXITY 2 9/3/2019 Mickey  Ivone JACKSON, PT GP 1          PT G-Codes  Outcome Measure Options: AM-PAC 6 Clicks Basic Mobility (PT)  AM-PAC 6 Clicks Score (PT): 17  AM-PAC 6 Clicks Score (OT): 15    Ivone Arevalo, PT  9/3/2019

## 2019-09-03 NOTE — DISCHARGE PLACEMENT REQUEST
"Lu Escobedo (64 y.o. Male)     Date of Birth Social Security Number Address Home Phone MRN    1955  [address] [place] [address] [phone] [id number]    [employer] Marital Status          Restorationist        Admission Date Admission Type Admitting Provider Attending Provider Department, Room/Bed    19 Emergency Huma Trujillo MD Furlow, Stephen Matthew, MD [address], [address]    Discharge Date Discharge Disposition Discharge Destination                       Attending Provider:  Giovany Moore MD    Allergies:  Lortab [Hydrocodone-acetaminophen], Morphine    Isolation:  None   Infection:  None   Code Status:  CPR    Ht:  167.6 cm (66\")   Wt:  53.4 kg (117 lb 12.8 oz)    Admission Cmt:  None   Principal Problem:  Acute on chronic respiratory failure with hypoxia (CMS/HCC) [J96.21]                 Active Insurance as of 2019     Primary Coverage     Payor Plan Insurance Group Employer/Plan Group    HUMANA MEDICARE REPLACEMENT HUMANA MEDICARE REPL [id number]     Payor Plan Address Payor Plan Phone Number Payor Plan Fax Number Effective Dates    [address] [address] [phone]  2018 - None Entered    [facility] [phone]       Subscriber Name Subscriber Birth Date Member ID       LU ESCOBEDO 1955 [id number]                 Emergency Contacts      (Rel.) Home Phone Work Phone Mobile Phone    Champ Escobedo (Spouse) [phone] -- --    Merlin Escobedo (Son) -- -- [phone]    debi escobedo (Son) -- -- [phone]        [address] [address]  Dept. Phone:  [phone]  Dept. Fax:   Date Ordered: Sep 3, 2019         Patient:  Lu Escobedo MRN:  [id number]   [address] [place] [address] :  1955  SSN:    Phone: [phone] Sex:  M     Weight: 53.4 kg (117 lb 12.8 oz)         Ht Readings from Last 1 Encounters:   19 167.6 cm (66\")         [facility] Chair          " "(Order ID: [id number])    Diagnosis:  Impaired mobility and ADLs (Z74.09 [ICD-10-CM] 799.89 [ICD-9-CM])  Hemiparesis, left (CMS/HCC) (G81.94 [ICD-10-CM] 342.90 [ICD-9-CM])  Chronic systolic right heart failure (CMS/HCC) (I50.22 [ICD-10-CM] 428.22 [ICD-9-CM])  COPD with acute exacerbation (CMS/HCC) (J44.1 [ICD-10-CM] 491.21 [ICD-9-CM])   Quantity:  1     Equipment:  Bedside Commode Chair w/Fixed Arms  Length of Need (99 Months = Lifetime): 99 Months = Lifetime        Authorizing Provider's Phone: [phone]   Verbal Order Mode: Verbal with readback   Authorizing Provider: Giovany Moore MD  Authorizing Provider's NPI: [id number]     Order Entered By: Rafael Kincaid RN 9/3/2019 11:30 AM     Electronically signed by:          [address] [address]  Dept. Phone:  [phone]  Dept. Fax:   Date Ordered: Sep 3, 2019         Patient:  Luther Martines MRN:  [id number]   [address] [place] [address] :  1955  SSN:    Phone: [phone] Sex:  M     Weight: 53.4 kg (117 lb 12.8 oz)         Ht Readings from Last 1 Encounters:   19 167.6 cm (66\")         Standard Wheelchair              (Order ID: [id number])    Diagnosis:  Impaired mobility and ADLs (Z74.09 [ICD-10-CM] 799.89 [ICD-9-CM])  Hemiparesis, left (CMS/HCC) (G81.94 [ICD-10-CM] 342.90 [ICD-9-CM])  Chronic systolic right heart failure (CMS/HCC) (I50.22 [ICD-10-CM] 428.22 [ICD-9-CM])  COPD with acute exacerbation (CMS/HCC) (J44.1 [ICD-10-CM] 491.21 [ICD-9-CM])  Acute on chronic respiratory failure with hypoxia (CMS/HCC) (J96.21 [ICD-10-CM] 518.84,799.02 [ICD-9-CM])   Quantity:  1     Equipment:  Standard Wheelchair  Wheelchair accessories:  Manual W/C Seat Widths 24-27 inches  Length of Need (99 Months = Lifetime): 99 Months = Lifetime        Authorizing Provider's Phone: [phone]   Verbal Order Mode: Verbal with readback   Authorizing Provider: Oscar" Giovany Gutierrez MD  Authorizing Provider's NPI: [id number]     Order Entered By: Rafael Kincaid RN 9/3/2019 11:29 AM     Electronically signed by:

## 2019-09-04 VITALS
HEART RATE: 94 BPM | TEMPERATURE: 98 F | DIASTOLIC BLOOD PRESSURE: 79 MMHG | RESPIRATION RATE: 16 BRPM | WEIGHT: 117.8 LBS | SYSTOLIC BLOOD PRESSURE: 136 MMHG | BODY MASS INDEX: 18.93 KG/M2 | OXYGEN SATURATION: 96 % | HEIGHT: 66 IN

## 2019-09-04 LAB — GLUCOSE BLDC GLUCOMTR-MCNC: 117 MG/DL (ref 70–130)

## 2019-09-04 PROCEDURE — 99239 HOSP IP/OBS DSCHRG MGMT >30: CPT | Performed by: INTERNAL MEDICINE

## 2019-09-04 PROCEDURE — 82962 GLUCOSE BLOOD TEST: CPT

## 2019-09-04 PROCEDURE — 94799 UNLISTED PULMONARY SVC/PX: CPT

## 2019-09-04 PROCEDURE — 63710000001 PREDNISONE PER 1 MG: Performed by: INTERNAL MEDICINE

## 2019-09-04 PROCEDURE — 25010000002 CEFTRIAXONE PER 250 MG: Performed by: PHYSICIAN ASSISTANT

## 2019-09-04 RX ORDER — BISOPROLOL FUMARATE 5 MG/1
5 TABLET, FILM COATED ORAL NIGHTLY
Qty: 30 TABLET | Refills: 0 | Status: SHIPPED | OUTPATIENT
Start: 2019-09-04

## 2019-09-04 RX ORDER — PREDNISONE 20 MG/1
40 TABLET ORAL
Qty: 2 TABLET | Refills: 0 | Status: SHIPPED | OUTPATIENT
Start: 2019-09-05 | End: 2019-09-07

## 2019-09-04 RX ORDER — DOXYCYCLINE 50 MG/1
100 CAPSULE ORAL 2 TIMES DAILY
Qty: 6 CAPSULE | Refills: 0 | Status: SHIPPED | OUTPATIENT
Start: 2019-09-04

## 2019-09-04 RX ORDER — DILTIAZEM HYDROCHLORIDE 180 MG/1
180 CAPSULE, COATED, EXTENDED RELEASE ORAL
Qty: 30 CAPSULE | Refills: 1 | Status: SHIPPED | OUTPATIENT
Start: 2019-09-04

## 2019-09-04 RX ORDER — NICOTINE 21 MG/24HR
1 PATCH, TRANSDERMAL 24 HOURS TRANSDERMAL EVERY 24 HOURS
Qty: 30 EACH | Refills: 0 | Status: SHIPPED | OUTPATIENT
Start: 2019-09-04

## 2019-09-04 RX ORDER — CEFDINIR 300 MG/1
300 CAPSULE ORAL 2 TIMES DAILY
Qty: 10 CAPSULE | Refills: 0 | Status: SHIPPED | OUTPATIENT
Start: 2019-09-04 | End: 2019-09-09

## 2019-09-04 RX ORDER — DILTIAZEM HYDROCHLORIDE 120 MG/1
120 CAPSULE, COATED, EXTENDED RELEASE ORAL ONCE
Status: COMPLETED | OUTPATIENT
Start: 2019-09-04 | End: 2019-09-04

## 2019-09-04 RX ADMIN — ASPIRIN 81 MG: 81 TABLET, COATED ORAL at 08:52

## 2019-09-04 RX ADMIN — OXYCODONE HYDROCHLORIDE AND ACETAMINOPHEN 1 TABLET: 5; 325 TABLET ORAL at 13:08

## 2019-09-04 RX ADMIN — OXYCODONE HYDROCHLORIDE AND ACETAMINOPHEN 1 TABLET: 5; 325 TABLET ORAL at 07:43

## 2019-09-04 RX ADMIN — CEFTRIAXONE 1 G: 1 INJECTION, POWDER, FOR SOLUTION INTRAMUSCULAR; INTRAVENOUS at 05:50

## 2019-09-04 RX ADMIN — DOXYCYCLINE 100 MG: 100 INJECTION, POWDER, LYOPHILIZED, FOR SOLUTION INTRAVENOUS at 12:13

## 2019-09-04 RX ADMIN — NICOTINE 1 PATCH: 21 PATCH, EXTENDED RELEASE TRANSDERMAL at 05:50

## 2019-09-04 RX ADMIN — SODIUM CHLORIDE, PRESERVATIVE FREE 10 ML: 5 INJECTION INTRAVENOUS at 08:48

## 2019-09-04 RX ADMIN — APIXABAN 5 MG: 5 TABLET, FILM COATED ORAL at 08:48

## 2019-09-04 RX ADMIN — LISINOPRIL 2.5 MG: 5 TABLET ORAL at 08:48

## 2019-09-04 RX ADMIN — PREDNISONE 40 MG: 20 TABLET ORAL at 08:48

## 2019-09-04 RX ADMIN — IPRATROPIUM BROMIDE AND ALBUTEROL SULFATE 3 ML: 2.5; .5 SOLUTION RESPIRATORY (INHALATION) at 02:52

## 2019-09-04 RX ADMIN — DILTIAZEM HYDROCHLORIDE 120 MG: 120 CAPSULE, COATED, EXTENDED RELEASE ORAL at 13:08

## 2019-09-04 RX ADMIN — DILTIAZEM HYDROCHLORIDE 180 MG: 180 CAPSULE, COATED, EXTENDED RELEASE ORAL at 08:48

## 2019-09-04 RX ADMIN — IPRATROPIUM BROMIDE AND ALBUTEROL SULFATE 3 ML: 2.5; .5 SOLUTION RESPIRATORY (INHALATION) at 11:59

## 2019-09-04 RX ADMIN — IPRATROPIUM BROMIDE AND ALBUTEROL SULFATE 3 ML: 2.5; .5 SOLUTION RESPIRATORY (INHALATION) at 08:00

## 2019-09-04 RX ADMIN — FAMOTIDINE 20 MG: 20 TABLET ORAL at 08:47

## 2019-09-04 RX ADMIN — ESCITALOPRAM OXALATE 10 MG: 10 TABLET ORAL at 08:48
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
## 2019-09-04 NOTE — CONSULTS
COPD NN spoke with pt at BS.  Pt alert and able to answer questions appropriately.  O2 sat 94% on 2L, baseline.  Pt appointment to see outpatient pulmonology set up and given to patients wife.  Action plan gone over, understanding verbalized.  No new questions or concerns voiced at this time.  NN will continue to follow.

## 2019-09-04 NOTE — PROGRESS NOTES
Case Management Discharge Note    Final Note: I met with Mr. Martines at the bedside. He is being discharged home today. His wheelchair and bedside commode have been delivered. He denies having any additional needs. His wife will transport him home by car.    Destination      No service has been selected for the patient.      Durable Medical Equipment      No service has been selected for the patient.      Dialysis/Infusion      No service has been selected for the patient.      Home Medical Care      No service has been selected for the patient.      Therapy      No service has been selected for the patient.      Community Resources      No service has been selected for the patient.             Final Discharge Disposition Code: 01 - home or self-care

## 2019-09-04 NOTE — PLAN OF CARE
Problem: Patient Care Overview  Goal: Plan of Care Review  Outcome: Ongoing (interventions implemented as appropriate)   09/04/19 0618   OTHER   Outcome Summary VSS on 2L. No issues noted overnight. Continuing to monitor.    Coping/Psychosocial   Plan of Care Reviewed With patient

## 2019-09-05 ENCOUNTER — READMISSION MANAGEMENT (OUTPATIENT)
Dept: CALL CENTER | Facility: HOSPITAL | Age: 64
End: 2019-09-05

## 2019-09-05 ENCOUNTER — TRANSITIONAL CARE MANAGEMENT TELEPHONE ENCOUNTER (OUTPATIENT)
Dept: FAMILY MEDICINE CLINIC | Facility: CLINIC | Age: 64
End: 2019-09-05

## 2019-09-05 NOTE — OUTREACH NOTE
JESSICA call completed. Please see flow sheet for additional details.  No show for JESSICA appt today.  Spoke w/ wife per CHEIKH.  She reports pt weak & conts to hallucinate - says this was happening at the hospital/is no worse and he has experienced this as common side effect of steroids/albuterol. Last dose pred today, still taking cefdinir & doxycycline.  Resp sx back to pt's chronic BL. Denies F/C, chest pain, palpitations.  No questions/immediate concerns, rescheduled JESSICA to 9/10, declined appt sooner. Confirms cardio f/u 9/11. Wife verb understanding of sx requiring immediate medical help as per AVS.

## 2019-09-05 NOTE — OUTREACH NOTE
Patient was discharged from [facility] on 9/4/19 and has a hospital follow up with PCP 9/5/19 which is within 2 business days of discharge.  No additional telephone outreach required.  TCM is applicable.

## 2019-09-05 NOTE — OUTREACH NOTE
Prep Survey      Responses   Facility patient discharged from?  [facility]   Is patient eligible?  Yes   Discharge diagnosis  Acute on chronic respiratory failure  Bilateral pneumonia  TIA   Does the patient have one of the following disease processes/diagnoses(primary or secondary)?  COPD/Pneumonia   Does the patient have Home health ordered?  No   Is there a DME ordered?  Yes   What DME was ordered?  ABLE  [facility]   Prep survey completed?  Yes          Kait Álvarez RN

## 2019-09-06 ENCOUNTER — READMISSION MANAGEMENT (OUTPATIENT)
Dept: CALL CENTER | Facility: HOSPITAL | Age: 64
End: 2019-09-06

## 2019-09-06 LAB
BACTERIA SPEC AEROBE CULT: NORMAL

## 2019-09-06 NOTE — OUTREACH NOTE
COPD/PN Week 1 Survey      Responses   Facility patient discharged from?  [facility]   Does the patient have one of the following disease processes/diagnoses(primary or secondary)?  COPD/Pneumonia   Is there a successful TCM telephone encounter documented?  Yes          Yancy Otto RN

## 2019-09-13 ENCOUNTER — READMISSION MANAGEMENT (OUTPATIENT)
Dept: CALL CENTER | Facility: HOSPITAL | Age: 64
End: 2019-09-13

## 2019-09-13 NOTE — OUTREACH NOTE
COPD/PN Week 2 Survey      Responses   Facility patient discharged from?  [facility]   Does the patient have one of the following disease processes/diagnoses(primary or secondary)?  COPD/Pneumonia   Was the primary reason for admission:  COPD exacerbation   Week 2 attempt successful?  Yes   Call start time  1057   Person spoke with today (if not patient) and relationship  Arrettia   Meds reviewed with patient/caregiver?  Yes   Is the patient taking all medications as directed (includes completed medication regime)?  Yes   Has the patient kept scheduled appointments due by today?  N/A   Comments  Has his follow up appts next week.   What is the patient's perception of their health status since discharge?  Improving   Patient reports what zone on this call?  Green Zone   Green Zone  Reports doing well, Breathing without shortness of breath, Usual activity and exercise level, Usual amount of phlegm/mucus without difficulty coughing up, Sleeping well, Appetite is good   Green Zone interventions:  Take daily medications, Continue regular exercise/diet plan, Do not smoke, Avoid indoor/outdoor triggers   Week 2 call completed?  Yes          Tyesha Hugo RN

## 2019-09-18 ENCOUNTER — TELEPHONE (OUTPATIENT)
Dept: FAMILY MEDICINE CLINIC | Facility: CLINIC | Age: 64
End: 2019-09-18

## 2019-09-20 DIAGNOSIS — I50.22 CHRONIC SYSTOLIC HEART FAILURE (HCC): ICD-10-CM

## 2019-09-21 RX ORDER — LISINOPRIL 2.5 MG/1
2.5 TABLET ORAL
Qty: 30 TABLET | Refills: 0 | Status: SHIPPED | OUTPATIENT
Start: 2019-09-21

## 2019-09-23 ENCOUNTER — READMISSION MANAGEMENT (OUTPATIENT)
Dept: CALL CENTER | Facility: HOSPITAL | Age: 64
End: 2019-09-23

## 2019-09-23 NOTE — OUTREACH NOTE
COPD/PN Week 3 Survey      Responses   Facility patient discharged from?  [facility]   Does the patient have one of the following disease processes/diagnoses(primary or secondary)?  COPD/Pneumonia   Was the primary reason for admission:  COPD exacerbation   Week 3 attempt successful?  No   Unsuccessful attempts  Attempt 1          Tiara Hillman RN

## 2019-09-27 ENCOUNTER — READMISSION MANAGEMENT (OUTPATIENT)
Dept: CALL CENTER | Facility: HOSPITAL | Age: 64
End: 2019-09-27

## 2019-09-27 NOTE — OUTREACH NOTE
COPD/PN Week 3 Survey      Responses   Facility patient discharged from?  [facility]   Does the patient have one of the following disease processes/diagnoses(primary or secondary)?  COPD/Pneumonia   Was the primary reason for admission:  COPD exacerbation   Week 3 attempt successful?  No   Unsuccessful attempts  Attempt 2          Sara Mcfarland RN

## 2019-12-11 DIAGNOSIS — F41.1 GENERALIZED ANXIETY DISORDER: ICD-10-CM

## 2019-12-11 DIAGNOSIS — J44.9 CHRONIC OBSTRUCTIVE PULMONARY DISEASE, UNSPECIFIED COPD TYPE (HCC): ICD-10-CM

## 2019-12-11 RX ORDER — HYDROXYZINE 50 MG/1
50 TABLET, FILM COATED ORAL NIGHTLY PRN
Qty: 30 TABLET | Refills: 5 | Status: SHIPPED | OUTPATIENT
Start: 2019-12-11 | End: 2020-12-22

## 2020-06-19 DIAGNOSIS — F41.1 GENERALIZED ANXIETY DISORDER: ICD-10-CM

## 2020-06-22 RX ORDER — HYDROXYZINE 50 MG/1
50 TABLET, FILM COATED ORAL NIGHTLY PRN
Qty: 30 TABLET | Refills: 5 | OUTPATIENT
Start: 2020-06-22

## 2020-12-21 DIAGNOSIS — F41.1 GENERALIZED ANXIETY DISORDER: ICD-10-CM

## 2020-12-22 RX ORDER — HYDROXYZINE 50 MG/1
50 TABLET, FILM COATED ORAL NIGHTLY PRN
Qty: 30 TABLET | Refills: 5 | Status: SHIPPED | OUTPATIENT
Start: 2020-12-22

## 2021-08-03 ENCOUNTER — HOME HEALTH ADMISSION (OUTPATIENT)
Dept: HOME HEALTH SERVICES | Facility: HOME HEALTHCARE | Age: 66
End: 2021-08-03

## 2021-08-03 ENCOUNTER — TRANSCRIBE ORDERS (OUTPATIENT)
Dept: HOME HEALTH SERVICES | Facility: HOME HEALTHCARE | Age: 66
End: 2021-08-03

## 2021-08-03 DIAGNOSIS — I25.10 CORONARY ARTERY DISEASE, ANGINA PRESENCE UNSPECIFIED, UNSPECIFIED VESSEL OR LESION TYPE, UNSPECIFIED WHETHER NATIVE OR TRANSPLANTED HEART: Primary | ICD-10-CM

## (undated) DEVICE — CATHETER,FOLEY,SILI-ELAST,LTX,20FR,10ML: Brand: MEDLINE

## (undated) DEVICE — PAD GRND REM POLYHESIVE A/ DISP

## (undated) DEVICE — LP CUT RT/ANGL 24FR

## (undated) DEVICE — SAFESECURE,SECUREMENT,FOLEY CATH,STERILE: Brand: MEDLINE

## (undated) DEVICE — EVAC BLDR UROVAC W ADAPT

## (undated) DEVICE — PK CYSTO-TUR BASIC 10

## (undated) DEVICE — CORD BOVIE 1P/U

## (undated) DEVICE — DRAINBAG,ANTI-REFLUX TOWER,L/F,2000ML,LL: Brand: MEDLINE

## (undated) DEVICE — GLV SURG SIGNATURE TOUCH PF LTX 7 STRL